# Patient Record
Sex: FEMALE | Race: WHITE | NOT HISPANIC OR LATINO
[De-identification: names, ages, dates, MRNs, and addresses within clinical notes are randomized per-mention and may not be internally consistent; named-entity substitution may affect disease eponyms.]

---

## 2019-02-05 ENCOUNTER — TRANSCRIPTION ENCOUNTER (OUTPATIENT)
Age: 64
End: 2019-02-05

## 2019-02-06 ENCOUNTER — INPATIENT (INPATIENT)
Facility: HOSPITAL | Age: 64
LOS: 6 days | Discharge: HOME | End: 2019-02-13
Attending: INTERNAL MEDICINE | Admitting: INTERNAL MEDICINE
Payer: COMMERCIAL

## 2019-02-06 VITALS
DIASTOLIC BLOOD PRESSURE: 64 MMHG | TEMPERATURE: 100 F | SYSTOLIC BLOOD PRESSURE: 132 MMHG | HEART RATE: 107 BPM | RESPIRATION RATE: 18 BRPM

## 2019-02-06 DIAGNOSIS — Z90.49 ACQUIRED ABSENCE OF OTHER SPECIFIED PARTS OF DIGESTIVE TRACT: Chronic | ICD-10-CM

## 2019-02-06 LAB
ALBUMIN SERPL ELPH-MCNC: 3.6 G/DL — SIGNIFICANT CHANGE UP (ref 3.3–5)
ALP SERPL-CCNC: 73 U/L — SIGNIFICANT CHANGE UP (ref 40–120)
ALT FLD-CCNC: 37 U/L — HIGH (ref 4–33)
ANION GAP SERPL CALC-SCNC: 18 MMO/L — HIGH (ref 7–14)
ANISOCYTOSIS BLD QL: SLIGHT — SIGNIFICANT CHANGE UP
AST SERPL-CCNC: 46 U/L — HIGH (ref 4–32)
B PERT DNA SPEC QL NAA+PROBE: NOT DETECTED — SIGNIFICANT CHANGE UP
BASOPHILS # BLD AUTO: 0.01 K/UL — SIGNIFICANT CHANGE UP (ref 0–0.2)
BASOPHILS NFR BLD AUTO: 0.2 % — SIGNIFICANT CHANGE UP (ref 0–2)
BASOPHILS NFR SPEC: 0 % — SIGNIFICANT CHANGE UP (ref 0–2)
BILIRUB SERPL-MCNC: 0.4 MG/DL — SIGNIFICANT CHANGE UP (ref 0.2–1.2)
BLASTS # FLD: 0 % — SIGNIFICANT CHANGE UP (ref 0–0)
BUN SERPL-MCNC: 19 MG/DL — SIGNIFICANT CHANGE UP (ref 7–23)
C PNEUM DNA SPEC QL NAA+PROBE: NOT DETECTED — SIGNIFICANT CHANGE UP
CALCIUM SERPL-MCNC: 8.7 MG/DL — SIGNIFICANT CHANGE UP (ref 8.4–10.5)
CHLORIDE SERPL-SCNC: 98 MMOL/L — SIGNIFICANT CHANGE UP (ref 98–107)
CO2 SERPL-SCNC: 19 MMOL/L — LOW (ref 22–31)
CREAT SERPL-MCNC: 0.82 MG/DL — SIGNIFICANT CHANGE UP (ref 0.5–1.3)
EOSINOPHIL # BLD AUTO: 0.12 K/UL — SIGNIFICANT CHANGE UP (ref 0–0.5)
EOSINOPHIL NFR BLD AUTO: 2.9 % — SIGNIFICANT CHANGE UP (ref 0–6)
EOSINOPHIL NFR FLD: 4.4 % — SIGNIFICANT CHANGE UP (ref 0–6)
FLUAV H1 2009 PAND RNA SPEC QL NAA+PROBE: NOT DETECTED — SIGNIFICANT CHANGE UP
FLUAV H1 RNA SPEC QL NAA+PROBE: NOT DETECTED — SIGNIFICANT CHANGE UP
FLUAV H3 RNA SPEC QL NAA+PROBE: NOT DETECTED — SIGNIFICANT CHANGE UP
FLUAV SUBTYP SPEC NAA+PROBE: NOT DETECTED — SIGNIFICANT CHANGE UP
FLUBV RNA SPEC QL NAA+PROBE: NOT DETECTED — SIGNIFICANT CHANGE UP
GIANT PLATELETS BLD QL SMEAR: PRESENT — SIGNIFICANT CHANGE UP
GLUCOSE SERPL-MCNC: 98 MG/DL — SIGNIFICANT CHANGE UP (ref 70–99)
HADV DNA SPEC QL NAA+PROBE: NOT DETECTED — SIGNIFICANT CHANGE UP
HBA1C BLD-MCNC: 5.3 % — SIGNIFICANT CHANGE UP (ref 4–5.6)
HCOV PNL SPEC NAA+PROBE: SIGNIFICANT CHANGE UP
HCT VFR BLD CALC: 39.6 % — SIGNIFICANT CHANGE UP (ref 34.5–45)
HGB BLD-MCNC: 12.7 G/DL — SIGNIFICANT CHANGE UP (ref 11.5–15.5)
HMPV RNA SPEC QL NAA+PROBE: NOT DETECTED — SIGNIFICANT CHANGE UP
HPIV1 RNA SPEC QL NAA+PROBE: NOT DETECTED — SIGNIFICANT CHANGE UP
HPIV2 RNA SPEC QL NAA+PROBE: NOT DETECTED — SIGNIFICANT CHANGE UP
HPIV3 RNA SPEC QL NAA+PROBE: NOT DETECTED — SIGNIFICANT CHANGE UP
HPIV4 RNA SPEC QL NAA+PROBE: NOT DETECTED — SIGNIFICANT CHANGE UP
HYPOCHROMIA BLD QL: SLIGHT — SIGNIFICANT CHANGE UP
IMM GRANULOCYTES NFR BLD AUTO: 0.5 % — SIGNIFICANT CHANGE UP (ref 0–1.5)
LYMPHOCYTES # BLD AUTO: 0.26 K/UL — LOW (ref 1–3.3)
LYMPHOCYTES # BLD AUTO: 6.4 % — LOW (ref 13–44)
LYMPHOCYTES NFR SPEC AUTO: 2.6 % — LOW (ref 13–44)
MCHC RBC-ENTMCNC: 25.7 PG — LOW (ref 27–34)
MCHC RBC-ENTMCNC: 32.1 % — SIGNIFICANT CHANGE UP (ref 32–36)
MCV RBC AUTO: 80 FL — SIGNIFICANT CHANGE UP (ref 80–100)
METAMYELOCYTES # FLD: 0 % — SIGNIFICANT CHANGE UP (ref 0–1)
MONOCYTES # BLD AUTO: 0.09 K/UL — SIGNIFICANT CHANGE UP (ref 0–0.9)
MONOCYTES NFR BLD AUTO: 2.2 % — SIGNIFICANT CHANGE UP (ref 2–14)
MONOCYTES NFR BLD: 0.9 % — LOW (ref 2–9)
MYELOCYTES NFR BLD: 0 % — SIGNIFICANT CHANGE UP (ref 0–0)
NEUTROPHIL AB SER-ACNC: 82.4 % — HIGH (ref 43–77)
NEUTROPHILS # BLD AUTO: 3.59 K/UL — SIGNIFICANT CHANGE UP (ref 1.8–7.4)
NEUTROPHILS NFR BLD AUTO: 87.8 % — HIGH (ref 43–77)
NEUTS BAND # BLD: 8.8 % — HIGH (ref 0–6)
NRBC # FLD: 0 K/UL — LOW (ref 25–125)
OTHER - HEMATOLOGY %: 0 — SIGNIFICANT CHANGE UP
PLATELET # BLD AUTO: 98 K/UL — LOW (ref 150–400)
PLATELET COUNT - ESTIMATE: SIGNIFICANT CHANGE UP
PMV BLD: 11.3 FL — SIGNIFICANT CHANGE UP (ref 7–13)
POLYCHROMASIA BLD QL SMEAR: SLIGHT — SIGNIFICANT CHANGE UP
POTASSIUM SERPL-MCNC: 4 MMOL/L — SIGNIFICANT CHANGE UP (ref 3.5–5.3)
POTASSIUM SERPL-SCNC: 4 MMOL/L — SIGNIFICANT CHANGE UP (ref 3.5–5.3)
PROMYELOCYTES # FLD: 0 % — SIGNIFICANT CHANGE UP (ref 0–0)
PROT SERPL-MCNC: 6.8 G/DL — SIGNIFICANT CHANGE UP (ref 6–8.3)
RBC # BLD: 4.95 M/UL — SIGNIFICANT CHANGE UP (ref 3.8–5.2)
RBC # FLD: 13.2 % — SIGNIFICANT CHANGE UP (ref 10.3–14.5)
RSV RNA SPEC QL NAA+PROBE: NOT DETECTED — SIGNIFICANT CHANGE UP
RV+EV RNA SPEC QL NAA+PROBE: NOT DETECTED — SIGNIFICANT CHANGE UP
SMUDGE CELLS # BLD: PRESENT — SIGNIFICANT CHANGE UP
SODIUM SERPL-SCNC: 135 MMOL/L — SIGNIFICANT CHANGE UP (ref 135–145)
VARIANT LYMPHS # BLD: 0.9 % — SIGNIFICANT CHANGE UP
WBC # BLD: 4.09 K/UL — SIGNIFICANT CHANGE UP (ref 3.8–10.5)
WBC # FLD AUTO: 4.09 K/UL — SIGNIFICANT CHANGE UP (ref 3.8–10.5)

## 2019-02-06 PROCEDURE — 76705 ECHO EXAM OF ABDOMEN: CPT | Mod: 26

## 2019-02-06 PROCEDURE — 71046 X-RAY EXAM CHEST 2 VIEWS: CPT | Mod: 26

## 2019-02-06 RX ORDER — DIPHENHYDRAMINE HCL 50 MG
25 CAPSULE ORAL EVERY 6 HOURS
Qty: 0 | Refills: 0 | Status: DISCONTINUED | OUTPATIENT
Start: 2019-02-06 | End: 2019-02-10

## 2019-02-06 RX ORDER — SODIUM CHLORIDE 9 MG/ML
1000 INJECTION INTRAMUSCULAR; INTRAVENOUS; SUBCUTANEOUS
Qty: 0 | Refills: 0 | Status: DISCONTINUED | OUTPATIENT
Start: 2019-02-06 | End: 2019-02-10

## 2019-02-06 RX ORDER — DIPHENHYDRAMINE HCL 50 MG
25 CAPSULE ORAL ONCE
Qty: 0 | Refills: 0 | Status: COMPLETED | OUTPATIENT
Start: 2019-02-06 | End: 2019-02-06

## 2019-02-06 RX ORDER — ACETAMINOPHEN 500 MG
975 TABLET ORAL ONCE
Qty: 0 | Refills: 0 | Status: DISCONTINUED | OUTPATIENT
Start: 2019-02-06 | End: 2019-02-06

## 2019-02-06 RX ORDER — IBUPROFEN 200 MG
600 TABLET ORAL EVERY 6 HOURS
Qty: 0 | Refills: 0 | Status: DISCONTINUED | OUTPATIENT
Start: 2019-02-06 | End: 2019-02-07

## 2019-02-06 RX ORDER — ACETAMINOPHEN 500 MG
1000 TABLET ORAL ONCE
Qty: 0 | Refills: 0 | Status: COMPLETED | OUTPATIENT
Start: 2019-02-06 | End: 2019-02-06

## 2019-02-06 RX ORDER — DIPHENHYDRAMINE HCL 50 MG
25 CAPSULE ORAL ONCE
Qty: 0 | Refills: 0 | Status: DISCONTINUED | OUTPATIENT
Start: 2019-02-06 | End: 2019-02-06

## 2019-02-06 RX ORDER — SODIUM CHLORIDE 9 MG/ML
1000 INJECTION INTRAMUSCULAR; INTRAVENOUS; SUBCUTANEOUS ONCE
Qty: 0 | Refills: 0 | Status: COMPLETED | OUTPATIENT
Start: 2019-02-06 | End: 2019-02-06

## 2019-02-06 RX ADMIN — SODIUM CHLORIDE 1000 MILLILITER(S): 9 INJECTION INTRAMUSCULAR; INTRAVENOUS; SUBCUTANEOUS at 20:02

## 2019-02-06 RX ADMIN — Medication 25 MILLIGRAM(S): at 20:13

## 2019-02-06 RX ADMIN — SODIUM CHLORIDE 1000 MILLILITER(S): 9 INJECTION INTRAMUSCULAR; INTRAVENOUS; SUBCUTANEOUS at 18:19

## 2019-02-06 RX ADMIN — Medication 400 MILLIGRAM(S): at 20:12

## 2019-02-06 RX ADMIN — Medication 125 MILLIGRAM(S): at 20:13

## 2019-02-06 RX ADMIN — SODIUM CHLORIDE 1000 MILLILITER(S): 9 INJECTION INTRAMUSCULAR; INTRAVENOUS; SUBCUTANEOUS at 21:11

## 2019-02-06 RX ADMIN — Medication 1000 MILLIGRAM(S): at 21:12

## 2019-02-06 NOTE — ED CDU PROVIDER INITIAL DAY NOTE - INDICATION FOR OBSERVATION
Other/Diagnostic Uncertainty/Supportive care, am labs per orders, Dermatology consultation 2/7 daytime, general observation care / monitoring.

## 2019-02-06 NOTE — ED ADULT NURSE NOTE - OBJECTIVE STATEMENT
break coverage RN- pt with + flu went to urgent care on Saturday and given naprosyn then developed generalized rash all over rash, denies any respiratory distress, facial swelling- pt awake, a/ox3, vitally stable in NAD at this time, IV 22g placed to right hand, blood work sent, medicated as ordered. MD at bedside, awaiting further orders from MD, will continue to monitor, pt safety maintained.

## 2019-02-06 NOTE — ED CDU PROVIDER INITIAL DAY NOTE - MEDICAL DECISION MAKING DETAILS
Supportive care, am labs per orders, Dermatology consultation 2/7 daytime, general observation care / monitoring.

## 2019-02-06 NOTE — ED ADULT TRIAGE NOTE - CHIEF COMPLAINT QUOTE
Flu like symptoms since Saturday went to urgent care + flu urgent care gave the pt naprosyn    pt developed rash all over her body no respiratory distress noted

## 2019-02-06 NOTE — CHART NOTE - NSCHARTNOTEFT_GEN_A_CORE
Dermatology Resident Chart Note    Pt and clinical photographs discussed with ED Team and attending Dr. Palumbo remotely. Briefly, 64 y/o F no significant PMHx presenting with diffuse macular rash x 1 day with low-grade fever, RUQ tenderness, systemic sx, and transaminitis to AST 45, ALT 37. Cr 0.82 and WBC 4.09 w/o eosinophilia. Dx at urgent care with influenza yesterday, and given ibuprofen. No new medications, herbal supplements, oral/mucosal involvement, facial swelling, or LAD.     Overall clinical picture concerning for evolving DRESS vs. drug eruption vs. viral exanthem.     At this time, recommend:  - agree with ED plan to admit to CDU for close monitoring  - send RVP to confirm dx of influenza and r/o other viral illnesses  - repeat CMP tomorrow AM to trend LFTs  - will f/u RUQ U/S   - page dermatology on call resident for any changes in status or development of skin sloughing, pain with urination, facial swelling, lymphadenopathy, mucosal involvement, or blistering as these are a sign of more serious drug eruption      Recommendations were verbally communicated to primary team. Dermatology consult team will officially round and staff the patient tomorrow. Please call [phone] with any questions. Dermatology Resident Chart Note    Pt and clinical photographs discussed with ED Team and attending Dr. Palumbo remotely. Briefly, 64 y/o F no significant PMHx presenting with diffuse macular rash x 1 day with low-grade fever, RUQ tenderness, systemic sx, and transaminitis to AST 45, ALT 37. Cr 0.82 and WBC 4.09 w/o eosinophilia. Dx at urgent care with influenza yesterday, and given ibuprofen. No new medications, herbal supplements, oral/mucosal involvement, facial swelling, or LAD.     Overall clinical picture concerning for evolving DRESS vs. drug eruption vs. viral exanthem.     At this time, recommend:  - agree with ED plan to admit to CDU for close monitoring  - send RVP to confirm dx of influenza and r/o other viral illnesses  - repeat CMP tomorrow AM to trend LFTs  - will f/u RUQ U/S   - page dermatology on call resident for any changes in status or development of skin sloughing, pain with urination, facial swelling, lymphadenopathy, mucosal involvement, or blistering as these are a sign of more serious drug eruption      Recommendations were verbally communicated to primary team. Dermatology consult team will officially round and staff the patient tomorrow. Please call [phone] with any questions.    Nara Mcneil, PGY2  [facility] Dermatology

## 2019-02-06 NOTE — ED PROVIDER NOTE - OBJECTIVE STATEMENT
Pt is a 62 y/o F nonsmoker PMHx appendectomy p/w body aches and chills x 3 days. Pt notes gradual onset generalized body aches, chills, sore throat, sneezing.  Pt also notes associated nausea and intermittent generalized abdominal "tickling" sensation.  Pt notes intermittent nonproductive cough.  Pt went to urgent care yesterday where pt had swab performed which revealed influenza.  Pt was given IBU at that time.  Pt notes later yesterday evening, pt noticed a red rash gradually worsening involving bilateral arms, abdomen, back, lower extremities, palms and soles . pt notes rash is intermittently itchy.  Pt states she has taken IBU in past before without any issues.  Pt denies any vomiting, chest pain, SOB, difficulty swallowing, new medications/foods/cosmetic products, h/o hiking, oral sores, or any other specific complaints.

## 2019-02-06 NOTE — ED PROVIDER NOTE - ATTENDING CONTRIBUTION TO CARE
DR. RODRIGUES, ATTENDING MD-  I performed a face to face bedside interview with patient regarding history of present illness, review of symptoms and past medical history. I completed an independent physical exam.  I have discussed patient's plan of care with PA.   Documentation as above in the note.    Krishan: c/o 2 days of malaise, cough went to urgent care yesterday where was diagnosed with influenza and given naprosyn.  ~1-2 hrs later patient developed diffuse rash which has worsened prompting her to come to ER.  No throat tightness/tongue swelling, no wheeze.  rash is itchy.  No recent abx or travel.  On exam, OP benign, no tongue swelling or uvular swelling, no lesions.  Lungs clear, no wheeze.  Heart rrr.  abd soft, but +TTP RUQ, neg Cavazos.  Skin: diffuse erythematous (appears urticarial to me) rash to chest/back/neck/thighs/arms/palms/soles.  No desquamation.  No blistering lesions.      A/P: rash - ?urticaria vs viral exanthem.  Will call derm given the findings with palm/sole involvement.  But patient otherwise non-toxic appearing.  RUQ pain - will check LFTs, sono

## 2019-02-06 NOTE — ED ADULT NURSE NOTE - NSIMPLEMENTINTERV_GEN_ALL_ED
Implemented All Universal Safety Interventions:  [place] to call system. Call bell, personal items and telephone within reach. Instruct patient to call for assistance. Room bathroom lighting operational. Non-slip footwear when patient is off stretcher. Physically safe environment: no spills, clutter or unnecessary equipment. Stretcher in lowest position, wheels locked, appropriate side rails in place.

## 2019-02-06 NOTE — ED CDU PROVIDER INITIAL DAY NOTE - OBJECTIVE STATEMENT
Pt is a 62 y/o F nonsmoker PMHx appendectomy p/w body aches and chills x 3 days. Pt notes gradual onset generalized body aches, chills, sore throat, sneezing.  Pt also notes associated nausea and intermittent generalized abdominal "tickling" sensation.  Pt notes intermittent nonproductive cough.  Pt went to urgent care yesterday where pt had swab performed which revealed influenza.  Pt was given IBU at that time.  Pt notes later yesterday evening, pt noticed a red rash gradually worsening involving bilateral arms, abdomen, back, lower extremities, palms and soles . pt notes rash is intermittently itchy.  Pt states she has taken IBU in past before without any issues.  Pt denies any vomiting, chest pain, SOB, difficulty swallowing, new medications/foods/cosmetic products, h/o hiking, oral sores, or any other specific complaints.    CDU [place] [place] Note----  64 yo female, no stated PMH, presented to the ED for body aches, chills, dx with influenza at urgent care center yesterday; also with rash since yesterday evening as per above.  Pt. was evaluated in the ED; Dermatology was contacted.  Pt. dispo'd to CDU for continued care plan:  Supportive care, am labs per orders, Dermatology consultation 2/7 daytime, general observation care / monitoring. Pt is a 64 y/o F nonsmoker PMHx appendectomy p/w body aches and chills x 3 days. Pt notes gradual onset generalized body aches, chills, sore throat, sneezing.  Pt also notes associated nausea and intermittent generalized abdominal "tickling" sensation.  Pt notes intermittent nonproductive cough.  Pt went to urgent care yesterday where pt had swab performed which revealed influenza.  Pt was given IBU at that time.  Pt notes later yesterday evening, pt noticed a red rash gradually worsening involving bilateral arms, abdomen, back, lower extremities, palms and soles . pt notes rash is intermittently itchy.  Pt states she has taken IBU in past before without any issues.  Pt denies any vomiting, chest pain, SOB, difficulty swallowing, new medications/foods/cosmetic products, h/o hiking, oral sores, or any other specific complaints.    CDU [place] [place] Note----  64 yo female, no stated PMH, presented to the ED for body aches, chills, dx with influenza at urgent care center yesterday; also with rash since yesterday evening as per above.  Pt. was evaluated in the ED; Dermatology was contacted.  Pt. dispo'd to CDU for continued care plan:  Supportive care, am labs per orders, Dermatology consultation 2/7 daytime, general observation care / monitoring.  No other c/o.

## 2019-02-06 NOTE — ED PROVIDER NOTE - MEDICAL DECISION MAKING DETAILS
Pt is a 64 y/o F nonsmoker PMHx appendectomy p/w body aches and chills x 3 days.  -- influenza, possible viral exanthem -- labs, RUQ sono, CXR, iv fluids, antipyretics, derm consult

## 2019-02-06 NOTE — ED CDU PROVIDER INITIAL DAY NOTE - ATTENDING CONTRIBUTION TO CARE
DR. RODRIGUES, ATTENDING MD-  I performed a face to face bedside interview with patient regarding history of present illness, review of symptoms and past medical history. I completed an independent physical exam.  I have discussed patient's plan of care with PA.   Documentation as above in the note.

## 2019-02-07 ENCOUNTER — RESULT REVIEW (OUTPATIENT)
Age: 64
End: 2019-02-07

## 2019-02-07 DIAGNOSIS — R21 RASH AND OTHER NONSPECIFIC SKIN ERUPTION: ICD-10-CM

## 2019-02-07 DIAGNOSIS — Z29.9 ENCOUNTER FOR PROPHYLACTIC MEASURES, UNSPECIFIED: ICD-10-CM

## 2019-02-07 LAB
ALBUMIN SERPL ELPH-MCNC: 3.2 G/DL — LOW (ref 3.3–5)
ALP SERPL-CCNC: 67 U/L — SIGNIFICANT CHANGE UP (ref 40–120)
ALT FLD-CCNC: 27 U/L — SIGNIFICANT CHANGE UP (ref 4–33)
ANION GAP SERPL CALC-SCNC: 11 MMO/L — SIGNIFICANT CHANGE UP (ref 7–14)
APPEARANCE UR: CLEAR — SIGNIFICANT CHANGE UP
AST SERPL-CCNC: 31 U/L — SIGNIFICANT CHANGE UP (ref 4–32)
BACTERIA # UR AUTO: NEGATIVE — SIGNIFICANT CHANGE UP
BASOPHILS # BLD AUTO: 0 K/UL — SIGNIFICANT CHANGE UP (ref 0–0.2)
BASOPHILS NFR BLD AUTO: 0 % — SIGNIFICANT CHANGE UP (ref 0–2)
BASOPHILS NFR SPEC: 0 % — SIGNIFICANT CHANGE UP (ref 0–2)
BILIRUB SERPL-MCNC: 0.2 MG/DL — SIGNIFICANT CHANGE UP (ref 0.2–1.2)
BILIRUB UR-MCNC: NEGATIVE — SIGNIFICANT CHANGE UP
BLASTS # FLD: 0 % — SIGNIFICANT CHANGE UP (ref 0–0)
BLOOD UR QL VISUAL: NEGATIVE — SIGNIFICANT CHANGE UP
BUN SERPL-MCNC: 12 MG/DL — SIGNIFICANT CHANGE UP (ref 7–23)
CALCIUM SERPL-MCNC: 8 MG/DL — LOW (ref 8.4–10.5)
CHLORIDE SERPL-SCNC: 109 MMOL/L — HIGH (ref 98–107)
CO2 SERPL-SCNC: 22 MMOL/L — SIGNIFICANT CHANGE UP (ref 22–31)
COLOR SPEC: SIGNIFICANT CHANGE UP
CREAT SERPL-MCNC: 0.57 MG/DL — SIGNIFICANT CHANGE UP (ref 0.5–1.3)
CRP SERPL-MCNC: 135.8 MG/L — HIGH
EOSINOPHIL # BLD AUTO: 0 K/UL — SIGNIFICANT CHANGE UP (ref 0–0.5)
EOSINOPHIL NFR BLD AUTO: 0 % — SIGNIFICANT CHANGE UP (ref 0–6)
EOSINOPHIL NFR FLD: 1.8 % — SIGNIFICANT CHANGE UP (ref 0–6)
ERYTHROCYTE [SEDIMENTATION RATE] IN BLOOD: 27 MM/HR — HIGH (ref 4–25)
GIANT PLATELETS BLD QL SMEAR: PRESENT — SIGNIFICANT CHANGE UP
GLUCOSE SERPL-MCNC: 162 MG/DL — HIGH (ref 70–99)
GLUCOSE UR-MCNC: NEGATIVE — SIGNIFICANT CHANGE UP
HAV IGM SER-ACNC: NONREACTIVE — SIGNIFICANT CHANGE UP
HBV CORE IGM SER-ACNC: NONREACTIVE — SIGNIFICANT CHANGE UP
HBV SURFACE AG SER-ACNC: NONREACTIVE — SIGNIFICANT CHANGE UP
HCT VFR BLD CALC: 35.6 % — SIGNIFICANT CHANGE UP (ref 34.5–45)
HCV AB S/CO SERPL IA: 0.16 S/CO — SIGNIFICANT CHANGE UP
HCV AB SERPL-IMP: SIGNIFICANT CHANGE UP
HGB BLD-MCNC: 11.4 G/DL — LOW (ref 11.5–15.5)
HYALINE CASTS # UR AUTO: NEGATIVE — SIGNIFICANT CHANGE UP
IMM GRANULOCYTES NFR BLD AUTO: 0.4 % — SIGNIFICANT CHANGE UP (ref 0–1.5)
KETONES UR-MCNC: NEGATIVE — SIGNIFICANT CHANGE UP
LEUKOCYTE ESTERASE UR-ACNC: SIGNIFICANT CHANGE UP
LYMPHOCYTES # BLD AUTO: 0.35 K/UL — LOW (ref 1–3.3)
LYMPHOCYTES # BLD AUTO: 15.2 % — SIGNIFICANT CHANGE UP (ref 13–44)
LYMPHOCYTES NFR SPEC AUTO: 5.4 % — LOW (ref 13–44)
MCHC RBC-ENTMCNC: 25.8 PG — LOW (ref 27–34)
MCHC RBC-ENTMCNC: 32 % — SIGNIFICANT CHANGE UP (ref 32–36)
MCV RBC AUTO: 80.5 FL — SIGNIFICANT CHANGE UP (ref 80–100)
METAMYELOCYTES # FLD: 0.9 % — SIGNIFICANT CHANGE UP (ref 0–1)
MICROCYTES BLD QL: SLIGHT — SIGNIFICANT CHANGE UP
MONOCYTES # BLD AUTO: 0.05 K/UL — SIGNIFICANT CHANGE UP (ref 0–0.9)
MONOCYTES NFR BLD AUTO: 2.2 % — SIGNIFICANT CHANGE UP (ref 2–14)
MONOCYTES NFR BLD: 0 % — LOW (ref 2–9)
MYELOCYTES NFR BLD: 0 % — SIGNIFICANT CHANGE UP (ref 0–0)
NEUTROPHIL AB SER-ACNC: 43.7 % — SIGNIFICANT CHANGE UP (ref 43–77)
NEUTROPHILS # BLD AUTO: 1.9 K/UL — SIGNIFICANT CHANGE UP (ref 1.8–7.4)
NEUTROPHILS NFR BLD AUTO: 82.2 % — HIGH (ref 43–77)
NEUTS BAND # BLD: 44.6 % — HIGH (ref 0–6)
NITRITE UR-MCNC: NEGATIVE — SIGNIFICANT CHANGE UP
NRBC # FLD: 0 K/UL — LOW (ref 25–125)
OTHER - HEMATOLOGY %: 0 — SIGNIFICANT CHANGE UP
OVALOCYTES BLD QL SMEAR: SLIGHT — SIGNIFICANT CHANGE UP
PH UR: 6.5 — SIGNIFICANT CHANGE UP (ref 5–8)
PLATELET # BLD AUTO: 82 K/UL — LOW (ref 150–400)
PLATELET COUNT - ESTIMATE: SIGNIFICANT CHANGE UP
PMV BLD: 11.9 FL — SIGNIFICANT CHANGE UP (ref 7–13)
POTASSIUM SERPL-MCNC: 4 MMOL/L — SIGNIFICANT CHANGE UP (ref 3.5–5.3)
POTASSIUM SERPL-SCNC: 4 MMOL/L — SIGNIFICANT CHANGE UP (ref 3.5–5.3)
PROMYELOCYTES # FLD: 0 % — SIGNIFICANT CHANGE UP (ref 0–0)
PROT SERPL-MCNC: 5.9 G/DL — LOW (ref 6–8.3)
PROT UR-MCNC: 20 — SIGNIFICANT CHANGE UP
RBC # BLD: 4.42 M/UL — SIGNIFICANT CHANGE UP (ref 3.8–5.2)
RBC # FLD: 13.4 % — SIGNIFICANT CHANGE UP (ref 10.3–14.5)
RBC CASTS # UR COMP ASSIST: SIGNIFICANT CHANGE UP (ref 0–?)
SODIUM SERPL-SCNC: 142 MMOL/L — SIGNIFICANT CHANGE UP (ref 135–145)
SP GR SPEC: 1.02 — SIGNIFICANT CHANGE UP (ref 1–1.04)
SQUAMOUS # UR AUTO: SIGNIFICANT CHANGE UP
T PALLIDUM AB TITR SER: NEGATIVE — SIGNIFICANT CHANGE UP
UROBILINOGEN FLD QL: NORMAL — SIGNIFICANT CHANGE UP
VARIANT LYMPHS # BLD: 3.6 % — SIGNIFICANT CHANGE UP
WBC # BLD: 2.31 K/UL — LOW (ref 3.8–10.5)
WBC # FLD AUTO: 2.31 K/UL — LOW (ref 3.8–10.5)
WBC UR QL: SIGNIFICANT CHANGE UP (ref 0–?)

## 2019-02-07 PROCEDURE — 99223 1ST HOSP IP/OBS HIGH 75: CPT | Mod: AI

## 2019-02-07 PROCEDURE — 99255 IP/OBS CONSLTJ NEW/EST HI 80: CPT | Mod: GC

## 2019-02-07 PROCEDURE — 88305 TISSUE EXAM BY PATHOLOGIST: CPT | Mod: 26

## 2019-02-07 RX ORDER — ACETAMINOPHEN 500 MG
650 TABLET ORAL EVERY 6 HOURS
Qty: 0 | Refills: 0 | Status: DISCONTINUED | OUTPATIENT
Start: 2019-02-07 | End: 2019-02-11

## 2019-02-07 RX ORDER — ACETAMINOPHEN 500 MG
650 TABLET ORAL ONCE
Qty: 0 | Refills: 0 | Status: COMPLETED | OUTPATIENT
Start: 2019-02-07 | End: 2019-02-07

## 2019-02-07 RX ADMIN — SODIUM CHLORIDE 150 MILLILITER(S): 9 INJECTION INTRAMUSCULAR; INTRAVENOUS; SUBCUTANEOUS at 02:02

## 2019-02-07 RX ADMIN — Medication 75 MILLIGRAM(S): at 21:40

## 2019-02-07 RX ADMIN — Medication 650 MILLIGRAM(S): at 10:19

## 2019-02-07 RX ADMIN — Medication 650 MILLIGRAM(S): at 09:49

## 2019-02-07 NOTE — H&P ADULT - ASSESSMENT
64 yo F with no PMH p/w diffuse rash and high fevers of unclear etiology but likely 2/2 viral exanthem vs. drug eruption.

## 2019-02-07 NOTE — ED CDU PROVIDER SUBSEQUENT DAY NOTE - PROGRESS NOTE DETAILS
Pt. objectively noted to be resting comfortably in the interim; no issues or c/o thus far.  Pt. stable at present; will continue to monitor.  Pt. will be signed out to the day CDU PA (Donato) and attending (Dr. Weeks) at 0700 hrs. Dermatology resident saw pt, will discuss with attending. Lab called, band neutrophils 44% this morning. Called ID who will consult on pt, paged hospitalist for admission. Spoke with ID who will see pt in the ED, spoke with Dr.John Almanza hospitalist who accepts this pt to his service

## 2019-02-07 NOTE — H&P ADULT - PROBLEM SELECTOR PLAN 2
DVT ppx: Improve score 0, low risk for DVT, no pharmacological ppx required. SCDs  Diet: Regular diet

## 2019-02-07 NOTE — ED CDU PROVIDER SUBSEQUENT DAY NOTE - MEDICAL DECISION MAKING DETAILS
63yF w/no pmhx p/w diffuse body rash in the setting of flu like symptoms and positive flu test at  yesterday. In CDU for dermatology evaluation

## 2019-02-07 NOTE — CONSULT NOTE ADULT - SUBJECTIVE AND OBJECTIVE BOX
HPI:  64 yo F with no significant PMH admitted for bandemia in the setting of fever and rash.  Patient reports rash started 2 days ago on her arms and legs and quickly progressed to involve her trunk.  Rash is completely asymptomatic.  Only medication she has taken in the past 6 months is ibuprofen which she takes occasionally.  Denies any other new medications, OTC medications, or herbal supplements. Has taken it for the past 3 days because she has been feeling ill.  Has had fever for 2 days, URI Sx, sore throat, and intermittent abdominal pain.  Normal appetite, also with myalgias.  Patient reports she went to Urgent Care yesterday and had rapid flu which was positive.  Denies sick contacts or recent travel.      PAST MEDICAL & SURGICAL HISTORY:  No pertinent past medical history  History of appendectomy      REVIEW OF SYSTEMS      General: +fever, +weakness    Skin/Breast: see HPI  	  Ophthalmologic: no eye pain or change in vision  	  ENMT: +rhinorrhea, sore throat    Respiratory and Thorax: +cough  	  Cardiovascular: no palpitations or chest pain    Gastrointestinal: no abdominal pain or blood in stool     Genitourinary: no dysuria or frequency    Musculoskeletal: no joint pains or weakness	    Neurological:no weakness, numbness, or tingling    MEDICATIONS  (STANDING):  sodium chloride 0.9%. 1000 milliLiter(s) (150 mL/Hr) IV Continuous <Continuous>    MEDICATIONS  (PRN):  diphenhydrAMINE   Injectable 25 milliGRAM(s) IV Push every 6 hours PRN Itching  ibuprofen  Tablet. 600 milliGRAM(s) Oral every 6 hours PRN Temp greater or equal to 38C (100.4F), Mild Pain (1 - 3), Moderate Pain (4 - 6)      Allergies    penicillin (Rash)    Intolerances        SOCIAL HISTORY:    FAMILY HISTORY:  No pertinent family history in first degree relatives      Vital Signs Last 24 Hrs  T(C): 36.3 (07 Feb 2019 10:45), Max: 38.2 (06 Feb 2019 20:15)  T(F): 97.4 (07 Feb 2019 10:45), Max: 100.8 (06 Feb 2019 20:15)  HR: 75 (07 Feb 2019 10:45) (71 - 107)  BP: 108/53 (07 Feb 2019 10:45) (97/47 - 132/64)  BP(mean): --  RR: 17 (07 Feb 2019 10:45) (16 - 18)  SpO2: 100% (07 Feb 2019 10:45) (99% - 100%)    PHYSICAL EXAM:     The patient was alert and oriented X 3, well nourished, and in no  apparent distress.  OP showed no ulcerations  There was no visible lymphadenopathy.  Conjunctiva were non injected  There was no clubbing or edema of extremities.  The scalp, hair, face, eyebrows, lips, OP, neck, chest, back,   extremities X 4, nails were examined.  There was no hyperhidrosis or bromhidrosis.    Of note on skin exam:  confluent erythematous patches on the trunk, upper extremities, and proximal lower extremities, involvement of the palms bilaterally; some areas with dusky centers; labial mucosa with shallow erosions, otherwise mucosal surfaces examined and normal in appearance; neg. Nikolsky sign      LABS:                        11.4   2.31  )-----------( 82       ( 07 Feb 2019 07:12 )             35.6     02-07    142  |  109<H>  |  12  ----------------------------<  162<H>  4.0   |  22  |  0.57    Ca    8.0<L>      07 Feb 2019 07:12    TPro  5.9<L>  /  Alb  3.2<L>  /  TBili  0.2  /  DBili  x   /  AST  31  /  ALT  27  /  AlkPhos  67  02-07      RADIOLOGY & ADDITIONAL STUDIES: N/A    Nora Pickering MD (PGY-3)   Dermatology Resident  [facility]  Pager: [phone]  Office [phone] HPI:  64 yo F with no significant PMH admitted for bandemia in the setting of fever and rash.  Patient reports rash started 2 days ago on her arms and legs and quickly progressed to involve her trunk.  Rash is completely asymptomatic.  Only medication she has taken in the past 6 months is ibuprofen which she takes occasionally.  Denies any other new medications, OTC medications, or herbal supplements. Has taken it for the past 3 days because she has been feeling ill.  Has had fever for 2 days, URI Sx, sore throat, and intermittent abdominal pain.  Normal appetite, also with myalgias.  Patient reports she went to Urgent Care yesterday and had rapid flu which was positive.  Denies sick contacts or recent travel.  Denies skin pain, dysuria, eye pain, vision changes, skin sloughing, or blisters.        PAST MEDICAL & SURGICAL HISTORY:  No pertinent past medical history  History of appendectomy      REVIEW OF SYSTEMS      General: +fever, +weakness    Skin/Breast: see HPI  	  Ophthalmologic: no eye pain or change in vision  	  ENMT: +rhinorrhea, sore throat    Respiratory and Thorax: +cough  	  Cardiovascular: no palpitations or chest pain    Gastrointestinal: no abdominal pain or blood in stool     Genitourinary: no dysuria or frequency    Musculoskeletal: no joint pains or weakness	    Neurological:no weakness, numbness, or tingling    MEDICATIONS  (STANDING):  sodium chloride 0.9%. 1000 milliLiter(s) (150 mL/Hr) IV Continuous <Continuous>    MEDICATIONS  (PRN):  diphenhydrAMINE   Injectable 25 milliGRAM(s) IV Push every 6 hours PRN Itching  ibuprofen  Tablet. 600 milliGRAM(s) Oral every 6 hours PRN Temp greater or equal to 38C (100.4F), Mild Pain (1 - 3), Moderate Pain (4 - 6)      Allergies    penicillin (Rash)    Intolerances        SOCIAL HISTORY:    FAMILY HISTORY:  No pertinent family history in first degree relatives      Vital Signs Last 24 Hrs  T(C): 36.3 (07 Feb 2019 10:45), Max: 38.2 (06 Feb 2019 20:15)  T(F): 97.4 (07 Feb 2019 10:45), Max: 100.8 (06 Feb 2019 20:15)  HR: 75 (07 Feb 2019 10:45) (71 - 107)  BP: 108/53 (07 Feb 2019 10:45) (97/47 - 132/64)  BP(mean): --  RR: 17 (07 Feb 2019 10:45) (16 - 18)  SpO2: 100% (07 Feb 2019 10:45) (99% - 100%)    PHYSICAL EXAM:     The patient was alert and oriented X 3, well nourished, and in no  apparent distress.  OP showed no ulcerations  There was no visible lymphadenopathy.  Conjunctiva were non injected  There was no clubbing or edema of extremities.  The scalp, hair, face, eyebrows, lips, OP, neck, chest, back,   extremities X 4, nails were examined.  There was no hyperhidrosis or bromhidrosis.    Of note on skin exam:  confluent erythematous patches on the trunk, upper extremities, and proximal lower extremities, involvement of the palms bilaterally; some areas with dusky centers; upper and lower labial mucosa with shallow erosions, +palatal petichiae; otherwise mucosal surfaces examined and normal in appearance; neg. Nikolsky sign      LABS:                        11.4   2.31  )-----------( 82       ( 07 Feb 2019 07:12 )             35.6     02-07    142  |  109<H>  |  12  ----------------------------<  162<H>  4.0   |  22  |  0.57    Ca    8.0<L>      07 Feb 2019 07:12    TPro  5.9<L>  /  Alb  3.2<L>  /  TBili  0.2  /  DBili  x   /  AST  31  /  ALT  27  /  AlkPhos  67  02-07    < from: US Abdomen Limited (02.06.19 @ 18:53) >  EXAM:  US ABDOMEN LIMITED        PROCEDURE DATE:  Feb 6 2019         INTERPRETATION:  CLINICAL INFORMATION: Right upper quadrant pain for 2   days, nausea.    COMPARISON: None available.    TECHNIQUE: Sonography of the right upper quadrant.     FINDINGS:    Liver: Within normal limits.    Bile ducts: Normal caliber. Common hepatic duct measures 3 mm.     Gallbladder: Contracted. No cholelithiasis, gallbladder wall thickening,   or pericholecystic fluid.    Pancreas: Not well visualized.    Right kidney: 2.6 cm. No hydronephrosis.    Ascites: None.    IVC: Visualized portions are within normal limits.    IMPRESSION:     No sonographic evidence of acute cholecystitis.        PAULO PRIETO M.D., RADIOLOGY RESIDENT  This document has been electronically signed.  BENNY STOKES M.D., ATTENDING RADIOLOGIST  This document has been electronically signed. Feb 6 2019  7:42PM                 RADIOLOGY & ADDITIONAL STUDIES: N/A    Nora Pickering MD (PGY-3)   Dermatology Resident  [facility]  Pager: [phone]  Office [phone]

## 2019-02-07 NOTE — ED CDU PROVIDER SUBSEQUENT DAY NOTE - PHYSICAL EXAMINATION
Skin: diffuse erythematous macular rash to arms/legs/trunk/back/palms and soles, this morning pt with new raised papule to left lateral tongue, no involvement of the palate or gums  blanching, non-tender to palpation

## 2019-02-07 NOTE — CONSULT NOTE ADULT - ATTENDING COMMENTS
63F with influenza, rash, egg allergy  -rapid flu positive at [facility] urgent care on 2/6 - urgent care note reviewed on HIE  -pt has measles IgG+ - she is immune to measles  -low suspicion of measles  -this is flu with rash  -rash could be from flu vs motrin related - f/u biopsy  -Tamiflu 75 mg po bid  -dc airborne precautions   -droplet precautions  -she has an egg allergy and did not get a flu shot this year    Norbert Leyva  Attending Physician   Division of Infectious Disease  Pager #[phone]  After 5pm/weekend or no response, call #[phone]
***Plan discussed with attending on call Dr. Anjelica Palumbo, patient will be rounded on with attending on 2/8***

## 2019-02-07 NOTE — ED CDU PROVIDER DISPOSITION NOTE - CLINICAL COURSE
64 yo F w/no pmhx p/w diffuse maculopapular blanching body rash which does not spare the palms of the hands and soles of the feet in the setting of flu like symptoms and positive flu test at  on 2/6. Pt was sent to CDU for dermatology evaluation who recommended admission for further ID work-up. Pt was found to be leukopenic w/ elevated bands. RVP neg, CXR neg, CT ABD neg, syphilis neg. Will admit and since the pt is being tested for measles although low likelihood as she has recent IgG+ titers, will place in isolation room.

## 2019-02-07 NOTE — H&P ADULT - PROBLEM SELECTOR PLAN 1
Pt with 2 day h/o diffuse rash associated with fever and arthralgias. Likely viral exanthem, less likely drug-induced as no new medications started, also very low on ddx is measles as pt has had positive IgG titers in past few years (IgG level 215 AU/ml in 2015).  - Derm c/s, recs appreciated.  - F/u derm punch biopsy results  - Per derm, low concern for measles but recommend sending measles IgM, IgG and placing on airborne isolation  - ID c/s, recs appreciated  - Supportive care for now  - Benadryl PRN for itching  - Tylenol PRN for pain and fever

## 2019-02-07 NOTE — H&P ADULT - NSHPLABSRESULTS_GEN_ALL_CORE
CBC Full  -  ( 07 Feb 2019 07:12 )  WBC Count : 2.31 K/uL  Hemoglobin : 11.4 g/dL  Hematocrit : 35.6 %  Platelet Count - Automated : 82 K/uL  Mean Cell Volume : 80.5 fL  Mean Cell Hemoglobin : 25.8 pg  Mean Cell Hemoglobin Concentration : 32.0 %  Auto Neutrophil # : 1.90 K/uL  Auto Lymphocyte # : 0.35 K/uL  Auto Monocyte # : 0.05 K/uL  Auto Eosinophil # : 0.00 K/uL  Auto Basophil # : 0.00 K/uL  Auto Neutrophil % : 82.2 %  Auto Lymphocyte % : 15.2 %  Auto Monocyte % : 2.2 %  Auto Eosinophil % : 0.0 %  Auto Basophil % : 0.0 %  Band Neutrophils %: 44.6 % (02.07.19 @ 07:12)    02-07    142  |  109<H>  |  12  ----------------------------<  162<H>  4.0   |  22  |  0.57    Ca    8.0<L>      07 Feb 2019 07:12    TPro  5.9<L>  /  Alb  3.2<L>  /  TBili  0.2  /  DBili  x   /  AST  31  /  ALT  27  /  AlkPhos  67  02-07    C-Reactive Protein, Serum: 135.8 mg/L (02.07.19 @ 07:12)  Treponema Pallidum Antibody Interpretation: Negative    Acute Hepatitis Panel (02.06.19 @ 20:22)    Hepatitis C Virus Interpretation: Nonreactive Hepatitis C AB  S/CO Ratio                        Interpretation  < 1.0                                     Non-Reactive  1.0 - 4.9                           Weakly-Reactive  > 5.0                                 Reactive  Non-Reactive: Aperson with a non-reactive HCV antibody  result is considered uninfected.  No further action is  needed unless recent infection is suspected.  In these  cases, consider repeat testing later to detect  seroconversion..  Weakly-Reactive: HCV antibody test is abnormal, HCV RNA  Qualitative test will follow.  Reactive: HCV antibody test is abnormal, HCV RNA  Qualitative test will follow.  Note: HCV antibody testing is performed on the [employer]   system.    Hepatitis C Virus S/CO Ratio: 0.16 S/CO    Hepatitis B Core IgM Antibody: Nonreactive    Hepatitis B Surface Antigen: Nonreactive    Hepatitis A IgM Antibody: Nonreactive        Rapid Respiratory Viral Panel (02.06.19 @ 20:09)    Adenovirus (RapRVP): Not Detected    Influenza A (RapRVP): Not Detected    Influenza AH1 2009 (RapRVP): Not Detected    Influenza AH1 (RapRVP): Not Detected    Influenza AH3 (RapRVP): Not Detected    Influenza B (RapRVP): Not Detected    Parainfluenza 1 (RapRVP): Not Detected    Parainfluenza 2 (RapRVP): Not Detected    Parainfluenza 3 (RapRVP): Not Detected    Parainfluenza 4 (RapRVP): Not Detected    Resp Syncytial Virus (RapRVP): Not Detected    Chlamydia pneumoniae (RapRVP): Not Detected    Mycoplasma pneumoniae (RapRVP): Not Detected    Entero/Rhinovirus (RapRVP): Not Detected    hMPV (RapRVP): Not Detected    Coronavirus (229E,HKU1,NL63,OC43): Not Detected This Respiratory Panel uses polymerase chain reaction (PCR)  to detect for adenovirus; coronavirus (HKU1, NL63, 229E,  OC43); human metapneumovirus (hMPV); human  enterovirus/rhinovirus (Entero/RV); influenza A; influenza  A/H1: influenza A/H3; influenza A/H1-2009; influenza B;  parainfluenza viruses 1,2,3,4; respiratory syncytial virus;  Mycoplasma pneumoniae; and Chlamydophila pneumoniae.    < from: US Abdomen Limited (02.06.19 @ 18:53) >    IMPRESSION:     No sonographic evidence of acute cholecystitis.    < end of copied text >

## 2019-02-07 NOTE — H&P ADULT - NSHPPHYSICALEXAM_GEN_ALL_CORE
Vital Signs Last 24 Hrs  T(F): 97.6 (07 Feb 2019 14:05), Max: 100.8 (06 Feb 2019 20:15)  HR: 73 (07 Feb 2019 14:05) (71 - 107)  BP: 115/57 (07 Feb 2019 14:05) (97/47 - 132/64)  RR: 16 (07 Feb 2019 14:05) (16 - 18)  SpO2: 99% (07 Feb 2019 14:05) (99% - 100%)    Constitutional: Older woman seen sitting on edge of bed in NAD with large areas of visible, erythematous rash  Eyes: PERRL, sclera clear  ENMT: MMM, 2 small white lesions seen on L side of tongue  Neck: Supple, no cervical LAD  Respiratory: CTAB, no rales, rhonchi or wheezes  Cardiovascular: RRR, no m/g/r  Gastrointestinal: +BS, soft, NT/ND  Neurological: AAOx3  Psychiatric: Appropriate affect and mood  Extremities: No LE edema  Skin: Large areas of coalescing, macular, erythematous, blanching rash seen most prominently on arms, abdomen and upper legs, rash also seen on back, chest, dorsum of feet and palms with smaller macules

## 2019-02-07 NOTE — H&P ADULT - ATTENDING COMMENTS
Patient seen and examined with son and daughter present.  Agree with Above A/p of Dr Woods  , T 100.4 on 2/6/19  Rash- ? etiology with 44 % Bandemia today with WBC 2.31 with ANC 1900  A/P Viral Rash with Leukocytopenia sec to virus - monitor for neutropenia.  R/o Parvo virus.  Agree with Id consult.  Dermatology consult appreciated. await result of biopsy Patient seen and examined with son and daughter present.  Agree with Above A/p of Dr Woods  , T 100.4 on 2/6/19  Rash- ? etiology with 44 % Bandemia today with WBC 2.31 with ANC 1900  A/P Viral Rash with Leukocytopenia sec to virus - monitor for neutropenia.  R/o Parvo virus.  Agree with Id consult.  Dermatology consult appreciated. await result of biopsy.  Airborn isolation.  benadryl prn itching. Patient seen and examined with son and daughter present.  Agree with Above A/p of Dr Woods  , T 100.4 on 2/6/19  Rash- ? etiology with 44 % Bandemia today with WBC 2.31 with ANC 1900  A/P Viral Rash with Leukocytopenia sec to virus - monitor for neutropenia.  R/o Parvo virus.   f/u rubella and other viral panels.  f/u urine and blood cultures.  Agree with Id consult.  Dermatology consult appreciated. await result of biopsy.  Airborn isolation.  benadryl prn itching. Patient seen and examined with son and daughter present.  Agree with Above A/p of Dr Woods  , T 100.4 on 2/6/19  Rash- ? etiology with 44 % Bandemia today with WBC 2.31 with ANC 1900  RVP negative.  T pallidum negative.  A/P Viral Rash with Leukocytopenia sec to virus - monitor for neutropenia.  R/o Parvo virus.   f/u rubella and other viral panels.  f/u urine and blood cultures.  Agree with Id consult.  Dermatology consult appreciated. await result of biopsy.  Airborn isolation.  benadryl prn itching. Patient seen and examined with son and daughter present.  Agree with Above A/p of Dr Woods  , T 100.4 on 2/6/19  Rash- ? etiology with 44 % Bandemia today with WBC 2.31 with ANC 1900  RVP negative.  T pallidum negative.  A/P Viral Rash with Leukocytopenia sec to virus - monitor for neutropenia.  R/o Parvo virus.   f/u rubella and other viral panels.  f/u urine and blood cultures.  Agree with Id consult.  Dermatology consult appreciated. await result of biopsy.  Airborn isolation.  benadryl prn itching.    Addendum  Id consult appreciated.  RVP 2/6 out patient showed flu- start tamiflu 75 mg bid.

## 2019-02-07 NOTE — H&P ADULT - NSHPREVIEWOFSYSTEMS_GEN_ALL_CORE
General: +fatigue; denies dizziness  Eyes: Denies blurry vision  ENMT: +rhinorrhea  Respiratory: +cough; denies SOB  Cardiovascular: Denies palpitations, CP  Gastrointestinal: +abd pain; denies N/V/D/C  : Denies dysuria, increased freq  Musculoskeletal: +joint pain; denies edema  Endocrine: Denies increased thirst, increased frequency  Allergic/Immunologic: +rashes; denies hives  Neuro: Denies weakness, numbness

## 2019-02-07 NOTE — H&P ADULT - HISTORY OF PRESENT ILLNESS
62 yo F with no PMH p/w rash. Pt endorses that 5 days ago, she developed body aches that seemed to mostly go away. The next day she felt feverish and achy and has had aches and fever ever since, developing a high grade fever to 103 at home 2 days ago along with a rash. Pt also endorses arthralgias throughout her body and has noticed a couple of lesions on the side of her tongue. Pt endorses that up until 2 days ago she did not notice any skin changes and then on coming home from work, she found that she had a red rash all over her arms, chest, abdomen and legs. Her face is spared but she has had worsening of the rash with spread to her palms over the past 2 days. She also endorses a mild cough, runny nose and mild, diffuse abdominal pain over this period. 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 She denies any new medication use. She went to urgent care yesterday and was supposedly diagnosed with influenza and was sent to the ED. Pt currently endorses itchiness over her rash, arthralgias but no more fever or muscle pains.    ED:  Initial VS - Tm 100.8, , /64, RR 16, SpO2 100%  Pt admitted to CDU, given Solumedrol 125  Repeat CBC showing increased bands from 8% to 44%  Derm and ID consulted

## 2019-02-07 NOTE — CONSULT NOTE ADULT - SUBJECTIVE AND OBJECTIVE BOX
Patient is a 63y old  Female who presents with a chief complaint of bandemia, rash (07 Feb 2019 13:01)    HPI:      prior hospital charts reviewed [  ]  primary team notes reviewed [  ]  other consultant notes reviewed [  ]    PAST MEDICAL & SURGICAL HISTORY:  No pertinent past medical history  History of appendectomy      Allergies  penicillin (Rash)        ANTIMICROBIALS (past 90 days)  MEDICATIONS  (STANDING):        ANTIMICROBIALS:        OTHER MEDS: MEDICATIONS  (STANDING):  diphenhydrAMINE   Injectable 25 every 6 hours PRN  ibuprofen  Tablet. 600 every 6 hours PRN      SOCIAL HISTORY:   hx smoking  non-smoker    FAMILY HISTORY:  No pertinent family history in first degree relatives      REVIEW OF SYSTEMS  [  ] ROS unobtainable because:    [  ] All other systems negative except as noted below:	    Constitutional:  [ ] fever [ ] chills  [ ] weight loss  [ ] weakness  Skin:  [ ] rash [ ] phlebitis	  Eyes: [ ] icterus [ ] pain  [ ] discharge	  ENMT: [ ] sore throat  [ ] thrush [ ] ulcers [ ] exudates  Respiratory: [ ] dyspnea [ ] hemoptysis [ ] cough [ ] sputum	  Cardiovascular:  [ ] chest pain [ ] palpitations [ ] edema	  Gastrointestinal:  [ ] nausea [ ] vomiting [ ] diarrhea [ ] constipation [ ] pain	  Genitourinary:  [ ] dysuria [ ] frequency [ ] hematuria [ ] discharge [ ] flank pain  [ ] incontinence  Musculoskeletal:  [ ] myalgias [ ] arthralgias [ ] arthritis  [ ] back pain  Neurological:  [ ] headache [ ] seizures  [ ] confusion/altered mental status  Psychiatric:  [ ] anxiety [ ] depression	  Hematology/Lymphatics:  [ ] lymphadenopathy  Endocrine:  [ ] adrenal [ ] thyroid  Allergic/Immunologic:	 [ ] transplant [ ] seasonal    Vital Signs Last 24 Hrs  T(F): 97.6 (02-07-19 @ 14:05), Max: 100.8 (02-06-19 @ 20:15)    Vital Signs Last 24 Hrs  HR: 73 (02-07-19 @ 14:05) (71 - 107)  BP: 115/57 (02-07-19 @ 14:05) (97/47 - 132/64)  RR: 16 (02-07-19 @ 14:05)  SpO2: 99% (02-07-19 @ 14:05) (99% - 100%)  Wt(kg): --    PHYSICAL EXAM:  General: non-toxic  HEAD/EYES: anicteric, PERRL  ENT:  supple  Cardiovascular:   S1, S2  Respiratory:  clear bilaterally  GI:  soft, non-tender, normal bowel sounds  :  no CVA tenderness   Musculoskeletal:  no synovitis  Neurologic:  grossly non-focal  Skin:  no rash  Lymph: no lymphadenopathy  Psychiatric:  appropriate affect  Vascular:  no phlebitis                                11.4   2.31  )-----------( 82       ( 07 Feb 2019 07:12 )             35.6     02-07    142  |  109<H>  |  12  ----------------------------<  162<H>  4.0   |  22  |  0.57    Ca    8.0<L>      07 Feb 2019 07:12    TPro  5.9<L>  /  Alb  3.2<L>  /  TBili  0.2  /  DBili  x   /  AST  31  /  ALT  27  /  AlkPhos  67  02-07          MICROBIOLOGY:                            RADIOLOGY:  imaging below personally reviewed Patient is a 63y old  Female who presents with a chief complaint of bandemia, rash (2019 13:01)    HPI: 62 yo female no significant pmh presents with rash and flu like symptoms. Says she noticed rash on Tuesday after going to urgent care for fevers. She was also experiencing chills and nausea. Says the rash appearance within the course of the day Tuesday and denies any pruritis. Pt says she has not done any recent traveling. Is in contact with a sick older male w/ cough. Denies taking any new medications, supplements.      prior hospital charts reviewed [ X ]  primary team notes reviewed [ X ]  other consultant notes reviewed [X ]    PAST MEDICAL & SURGICAL HISTORY   No pertinent past medical history  History of appendectomy at 12       Allergies  penicillin (Rash)        ANTIMICROBIALS (past 90 days)  MEDICATIONS  (STANDING):        ANTIMICROBIALS:        OTHER MEDS: MEDICATIONS  (STANDING):  diphenhydrAMINE   Injectable 25 every 6 hours PRN  ibuprofen  Tablet. 600 every 6 hours PRN      SOCIAL HISTORY:   hx smoking  non-smoker    FAMILY HISTORY:  Mother - Age 85 CAD  Father - Age 90 Cancer- unkown     REVIEW OF SYSTEMS  [  ] ROS unobtainable because:    [  ] All other systems negative except as noted below:	    Constitutional:  [ X] fever [ X] chills  [ ] weight loss  [X ] weakness  Skin:  [ X] rash [ ] phlebitis	  Eyes: [ ] icterus [ ] pain  [ ] discharge	  ENMT: [ ] sore throat  [ ] thrush [ ] ulcers [ ] exudates  Respiratory: [ ] dyspnea [ ] hemoptysis [ ] cough [ ] sputum	  Cardiovascular:  [ ] chest pain [ ] palpitations [ ] edema	  Gastrointestinal:  [X ] nausea [ ] vomiting [ ] diarrhea [ ] constipation [ ] pain	  Genitourinary:  [ ] dysuria [ ] frequency [ ] hematuria [ ] discharge [ ] flank pain  [ ] incontinence  Musculoskeletal:  [ ] myalgias [ ] arthralgias [ ] arthritis  [ ] back pain  Neurological:  [ ] headache [ ] seizures  [ ] confusion/altered mental status  Psychiatric:  [ ] anxiety [ ] depression	  Hematology/Lymphatics:  [ ] lymphadenopathy  Endocrine:  [ ] adrenal [ ] thyroid  Allergic/Immunologic:	 [ ] transplant [ ] seasonal    Vital Signs Last 24 Hrs  T(F): 97.6 (19 @ 14:05), Max: 100.8 (19 @ 20:15)    Vital Signs Last 24 Hrs  HR: 73 (19 @ 14:05) (71 - 107)  BP: 115/57 (19 @ 14:05) (97/47 - 132/64)  RR: 16 (19 @ 14:05)  SpO2: 99% (19 @ 14:05) (99% - 100%)  Wt(kg): --    PHYSICAL EXAM:  General: non-toxic  HEAD/EYES: anicteric, PERRL  ENT:  supple  Cardiovascular:   S1, S2  Respiratory:  clear bilaterally  GI:  soft, non-tender, normal bowel sounds  :  no CVA tenderness   Musculoskeletal:  no synovitis  Neurologic:  grossly non-focal  Skin: maculopapular rash extending from trunk to all 4 extremities   Lymph: no lymphadenopathy  Psychiatric:  appropriate affect  Vascular:  no phlebitis                                11.4   2.31  )-----------( 82       ( 2019 07:12 )             35.6     02-07    142  |  109<H>  |  12  ----------------------------<  162<H>  4.0   |  22  |  0.57    Ca    8.0<L>      2019 07:12    TPro  5.9<L>  /  Alb  3.2<L>  /  TBili  0.2  /  DBili  x   /  AST  31  /  ALT  27  /  AlkPhos  67            MICROBIOLOGY:                            RADIOLOGY:  imaging below personally reviewed

## 2019-02-07 NOTE — CONSULT NOTE ADULT - ASSESSMENT
62 yo F with confluent erythematous 62 yo otherwise healthy F with confluent erythematous patches some with central dusky appearance in the setting of fever, URI Sx, ddx includes infectious etiology such as viral exanthem vs. mycoplasma vs. toxin mediated eruption vs. drug eruption such as morbilliform drug eruption vs. early SJS.  The fact that this patient has no new medication exposures aside from ibuprofen, makes drug eruption less likely.       1. Confluent erythematous patches - ddx as above   -Continue to trend CBC, CMP  -Obtain mycoplasma IgM/IgG, EBV IgG/IgM  -Measles very low on ddx, patient reports she is immune and had testing ~3 years ago for job, but recommend obtaining measles IgG/IgM and placing patient on proper isolation as precaution   -4mm punch bx performed today from left back for dx   -Dermatology will continue to follow   -Please notify dermatology on call immediately if patient develops eye pain, dysuria, worsening mucosal involvement, skin sloughing, or bullae as these can be signs of serious drug eruption    PUNCH BIOPSY PROCEDURE NOTE  Punch biopsy diameter: 4mm  Sutures used: Gelfoam   Alternatives to this procedure, benefits of, and risks including bleeding, scarring, and infection were explained to the patient. Informed consent was documented and a consent form was signed by the patient and surgeon. The lesion was cleaned with alcohol and anesthetized with 1% lidocaine with epinephrine. A sterile punch instrument was used to incise a circular piece of tissue into the superficial subcutaneous plane, and the wound was closed with sutures above. The specimen was sent to pathology. Vaseline and a bandage were applied. The patient tolerated the procedure well with minimal blood loss and no complications. Post-op instructions were given. There were no pre-op or post-op medications. The patient was instructed to keep the biopsy site dry and covered for 24 hours.  After that, they may wash gently the area daily with mild soap and water and apply clean vaseline and a Bandaid until it has healed.  The patient was advised to contact us if they develop persistent bleeding, redness, swelling, increasing pain, or pus draining from the site. Patient's phone number was obtained for reporting of results.  Patient agreed to leaving of message with results if phone is not answered.      Nora Pickering MD (PGY-3)   Dermatology Resident  [facility]  Pager: [phone]  Office [phone]

## 2019-02-07 NOTE — ED CDU PROVIDER SUBSEQUENT DAY NOTE - HISTORY
63yF w/no pmhx presenting with diffuse body rash in the setting of 3 days of flu-like symptoms. Pt reported 3 days of body aches, chills, sore throat, cough and nausea, then she developed a rash to her arms/legs/trunk/back/palms and soles. Pt was seen at  before the rash erupted and was diagnosed with the flu from a nasal swab. Pt sent to CDU for dermatology consult.

## 2019-02-08 DIAGNOSIS — Z91.012 ALLERGY TO EGGS: ICD-10-CM

## 2019-02-08 DIAGNOSIS — D72.819 DECREASED WHITE BLOOD CELL COUNT, UNSPECIFIED: ICD-10-CM

## 2019-02-08 DIAGNOSIS — J11.1 INFLUENZA DUE TO UNIDENTIFIED INFLUENZA VIRUS WITH OTHER RESPIRATORY MANIFESTATIONS: ICD-10-CM

## 2019-02-08 LAB
ALBUMIN SERPL ELPH-MCNC: 3 G/DL — LOW (ref 3.3–5)
ALP SERPL-CCNC: 58 U/L — SIGNIFICANT CHANGE UP (ref 40–120)
ALT FLD-CCNC: 28 U/L — SIGNIFICANT CHANGE UP (ref 4–33)
ANION GAP SERPL CALC-SCNC: 13 MMO/L — SIGNIFICANT CHANGE UP (ref 7–14)
AST SERPL-CCNC: 30 U/L — SIGNIFICANT CHANGE UP (ref 4–32)
BASOPHILS # BLD AUTO: 0.01 K/UL — SIGNIFICANT CHANGE UP (ref 0–0.2)
BASOPHILS NFR BLD AUTO: 0.1 % — SIGNIFICANT CHANGE UP (ref 0–2)
BILIRUB SERPL-MCNC: < 0.2 MG/DL — LOW (ref 0.2–1.2)
BUN SERPL-MCNC: 9 MG/DL — SIGNIFICANT CHANGE UP (ref 7–23)
CALCIUM SERPL-MCNC: 7.9 MG/DL — LOW (ref 8.4–10.5)
CHLORIDE SERPL-SCNC: 108 MMOL/L — HIGH (ref 98–107)
CO2 SERPL-SCNC: 22 MMOL/L — SIGNIFICANT CHANGE UP (ref 22–31)
CREAT SERPL-MCNC: 0.54 MG/DL — SIGNIFICANT CHANGE UP (ref 0.5–1.3)
EBV EA AB TITR SER IF: POSITIVE — SIGNIFICANT CHANGE UP
EBV EA IGG SER-ACNC: NEGATIVE — SIGNIFICANT CHANGE UP
EBV PATRN SPEC IB-IMP: SIGNIFICANT CHANGE UP
EBV VCA IGG AVIDITY SER QL IA: POSITIVE — SIGNIFICANT CHANGE UP
EBV VCA IGM TITR FLD: NEGATIVE — SIGNIFICANT CHANGE UP
EOSINOPHIL # BLD AUTO: 0.16 K/UL — SIGNIFICANT CHANGE UP (ref 0–0.5)
EOSINOPHIL NFR BLD AUTO: 2.2 % — SIGNIFICANT CHANGE UP (ref 0–6)
GLUCOSE SERPL-MCNC: 78 MG/DL — SIGNIFICANT CHANGE UP (ref 70–99)
HCT VFR BLD CALC: 34.9 % — SIGNIFICANT CHANGE UP (ref 34.5–45)
HCV AB S/CO SERPL IA: 0.13 S/CO — SIGNIFICANT CHANGE UP
HCV AB SERPL-IMP: SIGNIFICANT CHANGE UP
HGB BLD-MCNC: 10.9 G/DL — LOW (ref 11.5–15.5)
IMM GRANULOCYTES NFR BLD AUTO: 0.6 % — SIGNIFICANT CHANGE UP (ref 0–1.5)
LYMPHOCYTES # BLD AUTO: 1.03 K/UL — SIGNIFICANT CHANGE UP (ref 1–3.3)
LYMPHOCYTES # BLD AUTO: 14.4 % — SIGNIFICANT CHANGE UP (ref 13–44)
M PNEUMO IGG SER IA-ACNC: 1.13 INDEX — HIGH
M PNEUMO IGG SER IA-ACNC: POSITIVE — SIGNIFICANT CHANGE UP
MAGNESIUM SERPL-MCNC: 1.9 MG/DL — SIGNIFICANT CHANGE UP (ref 1.6–2.6)
MCHC RBC-ENTMCNC: 25.3 PG — LOW (ref 27–34)
MCHC RBC-ENTMCNC: 31.2 % — LOW (ref 32–36)
MCV RBC AUTO: 81 FL — SIGNIFICANT CHANGE UP (ref 80–100)
MEV IGG SER-ACNC: 120 AU/ML — SIGNIFICANT CHANGE UP
MEV IGG+IGM SER-IMP: POSITIVE — SIGNIFICANT CHANGE UP
MONOCYTES # BLD AUTO: 0.42 K/UL — SIGNIFICANT CHANGE UP (ref 0–0.9)
MONOCYTES NFR BLD AUTO: 5.9 % — SIGNIFICANT CHANGE UP (ref 2–14)
NEUTROPHILS # BLD AUTO: 5.49 K/UL — SIGNIFICANT CHANGE UP (ref 1.8–7.4)
NEUTROPHILS NFR BLD AUTO: 76.8 % — SIGNIFICANT CHANGE UP (ref 43–77)
NRBC # FLD: 0 K/UL — LOW (ref 25–125)
PHOSPHATE SERPL-MCNC: 1 MG/DL — CRITICAL LOW (ref 2.5–4.5)
PLATELET # BLD AUTO: 116 K/UL — LOW (ref 150–400)
PMV BLD: 12.1 FL — SIGNIFICANT CHANGE UP (ref 7–13)
POTASSIUM SERPL-MCNC: 3.4 MMOL/L — LOW (ref 3.5–5.3)
POTASSIUM SERPL-SCNC: 3.4 MMOL/L — LOW (ref 3.5–5.3)
PROT SERPL-MCNC: 5.6 G/DL — LOW (ref 6–8.3)
RBC # BLD: 4.31 M/UL — SIGNIFICANT CHANGE UP (ref 3.8–5.2)
RBC # FLD: 13.3 % — SIGNIFICANT CHANGE UP (ref 10.3–14.5)
SODIUM SERPL-SCNC: 143 MMOL/L — SIGNIFICANT CHANGE UP (ref 135–145)
SPECIMEN SOURCE: SIGNIFICANT CHANGE UP
SPECIMEN SOURCE: SIGNIFICANT CHANGE UP
WBC # BLD: 7.15 K/UL — SIGNIFICANT CHANGE UP (ref 3.8–10.5)
WBC # FLD AUTO: 7.15 K/UL — SIGNIFICANT CHANGE UP (ref 3.8–10.5)

## 2019-02-08 PROCEDURE — 99222 1ST HOSP IP/OBS MODERATE 55: CPT

## 2019-02-08 PROCEDURE — 99232 SBSQ HOSP IP/OBS MODERATE 35: CPT

## 2019-02-08 RX ORDER — POTASSIUM PHOSPHATE, MONOBASIC POTASSIUM PHOSPHATE, DIBASIC 236; 224 MG/ML; MG/ML
30 INJECTION, SOLUTION INTRAVENOUS ONCE
Qty: 0 | Refills: 0 | Status: COMPLETED | OUTPATIENT
Start: 2019-02-08 | End: 2019-02-08

## 2019-02-08 RX ORDER — ACETAMINOPHEN 500 MG
1000 TABLET ORAL ONCE
Qty: 0 | Refills: 0 | Status: COMPLETED | OUTPATIENT
Start: 2019-02-08 | End: 2019-02-08

## 2019-02-08 RX ADMIN — Medication 75 MILLIGRAM(S): at 05:57

## 2019-02-08 RX ADMIN — SODIUM CHLORIDE 150 MILLILITER(S): 9 INJECTION INTRAMUSCULAR; INTRAVENOUS; SUBCUTANEOUS at 22:53

## 2019-02-08 RX ADMIN — SODIUM CHLORIDE 150 MILLILITER(S): 9 INJECTION INTRAMUSCULAR; INTRAVENOUS; SUBCUTANEOUS at 01:16

## 2019-02-08 RX ADMIN — Medication 400 MILLIGRAM(S): at 22:34

## 2019-02-08 RX ADMIN — POTASSIUM PHOSPHATE, MONOBASIC POTASSIUM PHOSPHATE, DIBASIC 85 MILLIMOLE(S): 236; 224 INJECTION, SOLUTION INTRAVENOUS at 20:22

## 2019-02-08 RX ADMIN — Medication 75 MILLIGRAM(S): at 18:23

## 2019-02-08 NOTE — ED ADULT NURSE REASSESSMENT NOTE - NS ED NURSE REASSESS COMMENT FT1
Report received from RACH Velazquez. Pt. received A&Ox4, ambulatory, with 22 gauge IV in right hand. No acute distress at present. Respirations are even and unlabored on room air. VS as noted. Call bell within reach. Will continue to monitor. Report received from RACH Velazquez. Pt. received A&Ox4, ambulatory, with 22 gauge IV in right hand, and red blotchy rash all over body. Pt. states the rash is not worse than before, denies sensation of throat closing and hoarse voice. No drooling, stridor, or gurgling noted. No acute distress at present. Respirations are even and unlabored on room air. VS as noted. Call bell within reach. Will continue to monitor.

## 2019-02-08 NOTE — PROGRESS NOTE ADULT - ASSESSMENT
63 year old F w/ no significant PMH w/ flu like symptoms + maculopapular rash for 3 days. Pt was febrile in ED to 100.8 and has remained afebrile since. Pt is leukopenic w/ elevated bands. RVP neg, CXR neg, CT ABD neg, syphilis neg.

## 2019-02-08 NOTE — CHART NOTE - NSCHARTNOTEFT_GEN_A_CORE
Patient seen today with attending.  Rash more confluent and conjunctival injection worsened.  Preliminary punch bx results showing interface dermatitis, sparse perivascular lymphs and rare eos.  Morbilliform drug eruption vs. viral exanthem.  Biopsy does NOT suggest SJS/TEN.  Dermatology will continue to follow patient.  Please notify dermatology on call immediately if patient develops skin pain, dysuria, genital/oral ulcerations, skin sloughing, or bullae as these can be signs of more serious drug eruption.    Nora Pickering MD (PGY-3)   Dermatology Resident  [facility]  Pager: [phone]  Office [phone] Patient seen today with attending Dr. Rivas.  Rash more confluent and conjunctival injection worsened.  Preliminary punch bx results showing interface dermatitis, sparse perivascular lymphs and rare eos.  Morbilliform drug eruption vs. viral exanthem.  Biopsy does NOT suggest SJS/TEN.  Dermatology will continue to follow patient.  Please notify dermatology on call immediately if patient develops skin pain, dysuria, genital/oral ulcerations, skin sloughing, or bullae as these can be signs of more serious drug eruption.    Nora Pickering MD (PGY-3)   Dermatology Resident  [facility]  Pager: [phone]  Office [phone]

## 2019-02-08 NOTE — PROGRESS NOTE ADULT - SUBJECTIVE AND OBJECTIVE BOX
DARREN CHÁVEZ 63y [id number]    Patient is a 63y old  Female who presents with a chief complaint of Rash (2019 14:53)      Follow Up/CC:  ID following for flu    Interval History/ROS: tired, no fever    Allergies    penicillin (Rash)    Intolerances        ANTIMICROBIALS:  oseltamivir 75 two times a day      MEDICATIONS  (STANDING):  oseltamivir 75 milliGRAM(s) Oral two times a day  sodium chloride 0.9%. 1000 milliLiter(s) (150 mL/Hr) IV Continuous <Continuous>    MEDICATIONS  (PRN):  acetaminophen   Tablet .. 650 milliGRAM(s) Oral every 6 hours PRN Temp greater or equal to 38C (100.4F), Mild Pain (1 - 3), Moderate Pain (4 - 6)  diphenhydrAMINE   Injectable 25 milliGRAM(s) IV Push every 6 hours PRN Itching        Vital Signs Last 24 Hrs  T(C): 37.5 (2019 12:03), Max: 37.5 (2019 12:03)  T(F): 99.5 (2019 12:03), Max: 99.5 (2019 12:03)  HR: 93 (2019 12:03) (70 - 93)  BP: 129/59 (2019 05:17) (110/64 - 135/49)  BP(mean): 65 (2019 12:03) (65 - 65)  RR: 18 (2019 12:03) (16 - 19)  SpO2: 98% (2019 12:03) (98% - 100%)    CBC Full  -  ( 2019 11:50 )  WBC Count : 7.15 K/uL  Hemoglobin : 10.9 g/dL  Hematocrit : 34.9 %  Platelet Count - Automated : 116 K/uL  Mean Cell Volume : 81.0 fL  Mean Cell Hemoglobin : 25.3 pg  Mean Cell Hemoglobin Concentration : 31.2 %  Auto Neutrophil # : 5.49 K/uL  Auto Lymphocyte # : 1.03 K/uL  Auto Monocyte # : 0.42 K/uL  Auto Eosinophil # : 0.16 K/uL  Auto Basophil # : 0.01 K/uL  Auto Neutrophil % : 76.8 %  Auto Lymphocyte % : 14.4 %  Auto Monocyte % : 5.9 %  Auto Eosinophil % : 2.2 %  Auto Basophil % : 0.1 %        142  |  109<H>  |  12  ----------------------------<  162<H>  4.0   |  22  |  0.57    Ca    8.0<L>      2019 07:12    TPro  5.9<L>  /  Alb  3.2<L>  /  TBili  0.2  /  DBili  x   /  AST  31  /  ALT  27  /  AlkPhos  67  02-07    LIVER FUNCTIONS - ( 2019 07:12 )  Alb: 3.2 g/dL / Pro: 5.9 g/dL / ALK PHOS: 67 u/L / ALT: 27 u/L / AST: 31 u/L / GGT: x           Urinalysis Basic - ( 2019 20:09 )    Color: LIGHT YELLOW / Appearance: CLEAR / S.019 / pH: 6.5  Gluc: NEGATIVE / Ketone: NEGATIVE  / Bili: NEGATIVE / Urobili: NORMAL   Blood: NEGATIVE / Protein: 20 / Nitrite: NEGATIVE   Leuk Esterase: TRACE / RBC: 0-2 / WBC 0-2   Sq Epi: FEW / Non Sq Epi: x / Bacteria: NEGATIVE        MICROBIOLOGY:    Rapid Respiratory Viral Panel (19 @ 20:09)    Adenovirus (RapRVP): Not Detected    Influenza A (RapRVP): Not Detected    Influenza AH1 2009 (RapRVP): Not Detected    Influenza AH1 (RapRVP): Not Detected    Influenza AH3 (RapRVP): Not Detected    Influenza B (RapRVP): Not Detected    Parainfluenza 1 (RapRVP): Not Detected    Parainfluenza 2 (RapRVP): Not Detected    Parainfluenza 3 (RapRVP): Not Detected    Parainfluenza 4 (RapRVP): Not Detected    Resp Syncytial Virus (RapRVP): Not Detected    Chlamydia pneumoniae (RapRVP): Not Detected    Mycoplasma pneumoniae (RapRVP): Not Detected    Entero/Rhinovirus (RapRVP): Not Detected    hMPV (RapRVP): Not Detected    Coronavirus (229E,HKU1,NL63,OC43): Not Detected This Respiratory Panel uses polymerase chain reaction (PCR)  to detect for adenovirus; coronavirus (HKU1, NL63, 229E,  OC43); human metapneumovirus (hMPV); human  enterovirus/rhinovirus (Entero/RV); influenza A; influenza  A/H1: influenza A/H3; influenza A/H1-2009; influenza B;  parainfluenza viruses 1,2,3,4; respiratory syncytial virus;  Mycoplasma pneumoniae; and Chlamydophila pneumoniae.          RADIOLOGY    < from: US Abdomen Limited (19 @ 18:53) >  No sonographic evidence of acute cholecystitis.    < end of copied text >

## 2019-02-08 NOTE — PROGRESS NOTE ADULT - SUBJECTIVE AND OBJECTIVE BOX
Patient is a 63y old  Female who presents with a chief complaint of Rash (2019 12:08)      SUBJECTIVE / OVERNIGHT EVENTS:    Events noted.  Rash    MEDICATIONS  (STANDING):  acetaminophen  IVPB .. 1000 milliGRAM(s) IV Intermittent once  oseltamivir 75 milliGRAM(s) Oral two times a day  sodium chloride 0.9%. 1000 milliLiter(s) (150 mL/Hr) IV Continuous <Continuous>    MEDICATIONS  (PRN):  acetaminophen   Tablet .. 650 milliGRAM(s) Oral every 6 hours PRN Temp greater or equal to 38C (100.4F), Mild Pain (1 - 3), Moderate Pain (4 - 6)  diphenhydrAMINE   Injectable 25 milliGRAM(s) IV Push every 6 hours PRN Itching        CAPILLARY BLOOD GLUCOSE        I&O's Summary      PHYSICAL EXAM:  GENERAL: NAD  NECK: Supple, No JVD  CHEST/LUNG: Clear to auscultation bilaterally; No wheezing.  HEART: Regular rate and rhythm; No murmurs, rubs, or gallops  ABDOMEN: Soft, Nontender, Nondistended; Bowel sounds present  EXTREMITIES:   Maculopapular Rash  NEUROLOGY: AAO X 3      LABS:                        10.9   7.15  )-----------( 116      ( 2019 11:50 )             34.9     02-08    143  |  108<H>  |  9   ----------------------------<  78  3.4<L>   |  22  |  0.54    Ca    7.9<L>      2019 11:50  Phos  1.0     -08  Mg     1.9     -08    TPro  5.6<L>  /  Alb  3.0<L>  /  TBili  < 0.2<L>  /  DBili  x   /  AST  30  /  ALT  28  /  AlkPhos  58  02-08          Urinalysis Basic - ( 2019 20:09 )    Color: LIGHT YELLOW / Appearance: CLEAR / S.019 / pH: 6.5  Gluc: NEGATIVE / Ketone: NEGATIVE  / Bili: NEGATIVE / Urobili: NORMAL   Blood: NEGATIVE / Protein: 20 / Nitrite: NEGATIVE   Leuk Esterase: TRACE / RBC: 0-2 / WBC 0-2   Sq Epi: FEW / Non Sq Epi: x / Bacteria: NEGATIVE      CAPILLARY BLOOD GLUCOSE         @ 13:30  Culture-urine --  Culture results --  method type --  Organism --  Organism Identification --  Specimen source BLOOD PERIPHERAL            @ 13:30  Culture blood   NO ORGANISMS ISOLATED  NO ORGANISMS ISOLATED AT 24 HOURS  Culture results --  Gram stain --  Gram stain blood --  Method type --  Organism --  Organism identification --  Specimen source BLOOD PERIPHERAL      RADIOLOGY & ADDITIONAL TESTS:    Imaging Personally Reviewed:    Consultant(s) Notes Reviewed:      Care Discussed with Consultants/Other Providers:

## 2019-02-08 NOTE — PROGRESS NOTE ADULT - ASSESSMENT
· Assessment	  62 yo F with no PMH p/w diffuse rash and high fevers of unclear etiology but likely 2/2 viral exanthem vs. drug eruption.     Problem/Plan - 1:  ·  Problem: Rash.  Plan: Derm f/up noted. F/up with skin biopsy.    Flu like symptoms:  Tamiflu  ID f/up noted

## 2019-02-09 LAB
ANION GAP SERPL CALC-SCNC: 12 MMO/L — SIGNIFICANT CHANGE UP (ref 7–14)
ANISOCYTOSIS BLD QL: SLIGHT — SIGNIFICANT CHANGE UP
B19V DNA FLD QL NAA+PROBE: SIGNIFICANT CHANGE UP IU/ML
BASOPHILS # BLD AUTO: 0.01 K/UL — SIGNIFICANT CHANGE UP (ref 0–0.2)
BASOPHILS NFR BLD AUTO: 0.2 % — SIGNIFICANT CHANGE UP (ref 0–2)
BASOPHILS NFR SPEC: 0 % — SIGNIFICANT CHANGE UP (ref 0–2)
BLASTS # FLD: 0 % — SIGNIFICANT CHANGE UP (ref 0–0)
BUN SERPL-MCNC: 6 MG/DL — LOW (ref 7–23)
CALCIUM SERPL-MCNC: 7.3 MG/DL — LOW (ref 8.4–10.5)
CHLORIDE SERPL-SCNC: 109 MMOL/L — HIGH (ref 98–107)
CO2 SERPL-SCNC: 22 MMOL/L — SIGNIFICANT CHANGE UP (ref 22–31)
CREAT SERPL-MCNC: 0.56 MG/DL — SIGNIFICANT CHANGE UP (ref 0.5–1.3)
EOSINOPHIL # BLD AUTO: 0.24 K/UL — SIGNIFICANT CHANGE UP (ref 0–0.5)
EOSINOPHIL NFR BLD AUTO: 4.9 % — SIGNIFICANT CHANGE UP (ref 0–6)
EOSINOPHIL NFR FLD: 3.5 % — SIGNIFICANT CHANGE UP (ref 0–6)
GIANT PLATELETS BLD QL SMEAR: PRESENT — SIGNIFICANT CHANGE UP
GLUCOSE SERPL-MCNC: 83 MG/DL — SIGNIFICANT CHANGE UP (ref 70–99)
HCT VFR BLD CALC: 30.1 % — LOW (ref 34.5–45)
HGB BLD-MCNC: 9.8 G/DL — LOW (ref 11.5–15.5)
HYPOCHROMIA BLD QL: SLIGHT — SIGNIFICANT CHANGE UP
IMM GRANULOCYTES NFR BLD AUTO: 0.6 % — SIGNIFICANT CHANGE UP (ref 0–1.5)
LYMPHOCYTES # BLD AUTO: 1.29 K/UL — SIGNIFICANT CHANGE UP (ref 1–3.3)
LYMPHOCYTES # BLD AUTO: 26.3 % — SIGNIFICANT CHANGE UP (ref 13–44)
LYMPHOCYTES NFR SPEC AUTO: 14.9 % — SIGNIFICANT CHANGE UP (ref 13–44)
MAGNESIUM SERPL-MCNC: 1.6 MG/DL — SIGNIFICANT CHANGE UP (ref 1.6–2.6)
MCHC RBC-ENTMCNC: 26.1 PG — LOW (ref 27–34)
MCHC RBC-ENTMCNC: 32.6 % — SIGNIFICANT CHANGE UP (ref 32–36)
MCV RBC AUTO: 80.1 FL — SIGNIFICANT CHANGE UP (ref 80–100)
METAMYELOCYTES # FLD: 0 % — SIGNIFICANT CHANGE UP (ref 0–1)
MEV IGM SER-ACNC: NEGATIVE — SIGNIFICANT CHANGE UP
MICROCYTES BLD QL: SLIGHT — SIGNIFICANT CHANGE UP
MONOCYTES # BLD AUTO: 0.48 K/UL — SIGNIFICANT CHANGE UP (ref 0–0.9)
MONOCYTES NFR BLD AUTO: 9.8 % — SIGNIFICANT CHANGE UP (ref 2–14)
MONOCYTES NFR BLD: 3.5 % — SIGNIFICANT CHANGE UP (ref 2–9)
MYELOCYTES NFR BLD: 0 % — SIGNIFICANT CHANGE UP (ref 0–0)
NEUTROPHIL AB SER-ACNC: 67.6 % — SIGNIFICANT CHANGE UP (ref 43–77)
NEUTROPHILS # BLD AUTO: 2.85 K/UL — SIGNIFICANT CHANGE UP (ref 1.8–7.4)
NEUTROPHILS NFR BLD AUTO: 58.2 % — SIGNIFICANT CHANGE UP (ref 43–77)
NEUTS BAND # BLD: 7 % — HIGH (ref 0–6)
NRBC # FLD: 0 K/UL — LOW (ref 25–125)
OTHER - HEMATOLOGY %: 0 — SIGNIFICANT CHANGE UP
PHOSPHATE SERPL-MCNC: 2.7 MG/DL — SIGNIFICANT CHANGE UP (ref 2.5–4.5)
PLATELET # BLD AUTO: 123 K/UL — LOW (ref 150–400)
PLATELET COUNT - ESTIMATE: SIGNIFICANT CHANGE UP
PMV BLD: 11.5 FL — SIGNIFICANT CHANGE UP (ref 7–13)
POIKILOCYTOSIS BLD QL AUTO: SLIGHT — SIGNIFICANT CHANGE UP
POTASSIUM SERPL-MCNC: 3.1 MMOL/L — LOW (ref 3.5–5.3)
POTASSIUM SERPL-SCNC: 3.1 MMOL/L — LOW (ref 3.5–5.3)
PROMYELOCYTES # FLD: 0 % — SIGNIFICANT CHANGE UP (ref 0–0)
RBC # BLD: 3.76 M/UL — LOW (ref 3.8–5.2)
RBC # FLD: 13.4 % — SIGNIFICANT CHANGE UP (ref 10.3–14.5)
SMUDGE CELLS # BLD: PRESENT — SIGNIFICANT CHANGE UP
SODIUM SERPL-SCNC: 143 MMOL/L — SIGNIFICANT CHANGE UP (ref 135–145)
VARIANT LYMPHS # BLD: 3.5 % — SIGNIFICANT CHANGE UP
WBC # BLD: 4.9 K/UL — SIGNIFICANT CHANGE UP (ref 3.8–10.5)
WBC # FLD AUTO: 4.9 K/UL — SIGNIFICANT CHANGE UP (ref 3.8–10.5)

## 2019-02-09 PROCEDURE — 99232 SBSQ HOSP IP/OBS MODERATE 35: CPT

## 2019-02-09 RX ORDER — POTASSIUM CHLORIDE 20 MEQ
40 PACKET (EA) ORAL ONCE
Qty: 0 | Refills: 0 | Status: COMPLETED | OUTPATIENT
Start: 2019-02-09 | End: 2019-02-09

## 2019-02-09 RX ADMIN — Medication 650 MILLIGRAM(S): at 21:52

## 2019-02-09 RX ADMIN — Medication 650 MILLIGRAM(S): at 12:39

## 2019-02-09 RX ADMIN — Medication 30 MILLILITER(S): at 15:32

## 2019-02-09 RX ADMIN — SODIUM CHLORIDE 150 MILLILITER(S): 9 INJECTION INTRAMUSCULAR; INTRAVENOUS; SUBCUTANEOUS at 21:38

## 2019-02-09 RX ADMIN — Medication 75 MILLIGRAM(S): at 18:26

## 2019-02-09 RX ADMIN — Medication 75 MILLIGRAM(S): at 06:02

## 2019-02-09 RX ADMIN — Medication 650 MILLIGRAM(S): at 22:52

## 2019-02-09 RX ADMIN — SODIUM CHLORIDE 150 MILLILITER(S): 9 INJECTION INTRAMUSCULAR; INTRAVENOUS; SUBCUTANEOUS at 12:40

## 2019-02-09 RX ADMIN — Medication 650 MILLIGRAM(S): at 06:19

## 2019-02-09 RX ADMIN — Medication 40 MILLIEQUIVALENT(S): at 12:39

## 2019-02-09 RX ADMIN — Medication 650 MILLIGRAM(S): at 13:27

## 2019-02-09 NOTE — PROGRESS NOTE ADULT - ASSESSMENT
· Assessment	  62 yo F with no PMH p/w diffuse rash and high fevers of unclear etiology but likely 2/2 viral exanthem vs. drug eruption.     Problem/Plan - 1:  ·  Problem: Rash.  Plan:  Skin biopsy: Meds vs viral inf.    Flu like symptoms:  Tamiflu  ID f/up     Dw family

## 2019-02-09 NOTE — PROGRESS NOTE ADULT - ASSESSMENT
63 year old F w/ no significant PMH w/ flu like symptoms + maculopapular rash for 3 days. Pt was febrile in ED to 100.8 and has remained afebrile since. Pt is leukopenic w/ elevated bands. RVP neg, CXR neg, CT ABD neg, syphilis neg.   Resolved leucopenia, improving bands, rash improving, fevers.   Clinically improved.   Skin bx consistent with drug eruption, viral exanthem is a possibility too.       Plan: 63 year old F w/ no significant PMH w/ flu like symptoms + maculopapular rash for 3 days. Pt was febrile in ED to 100.8 and has remained afebrile since. Pt is leukopenic w/ elevated bands. RVP neg, CXR neg, CT ABD neg, syphilis neg.   Resolved leucopenia, improving bands, rash improving, fevers.   Clinically improved.   Skin bx consistent with drug eruption, viral exanthem is a possibility too.       Plan:   Continue with Tamiflu  Fever likely drug fever.   Monitor CBC.   Blood cx NTD.   Follow up mycoplasma IgM   If spikes again consider performing repeat blood cx.

## 2019-02-09 NOTE — PROGRESS NOTE ADULT - SUBJECTIVE AND OBJECTIVE BOX
63y old  Female who presents with a chief complaint of Rash (08 Feb 2019 16:12)      Interval history:  Febrile, rash improving, some neck pain, feels weak and tired. But improved than yesterday.     Allergies:   penicillin (Rash)      Antimicrobials:  oseltamivir 75 milliGRAM(s) Oral two times a day      REVIEW OF SYSTEMS:  No chest pain  No cough, no SOB  No N/V  No dysuria       Vital Signs Last 24 Hrs  T(C): 36.5 (02-09-19 @ 13:51), Max: 39.8 (02-08-19 @ 21:49)  T(F): 97.7 (02-09-19 @ 13:51), Max: 103.6 (02-08-19 @ 21:49)  HR: 88 (02-09-19 @ 13:51) (77 - 104)  BP: 103/56 (02-09-19 @ 13:51) (102/50 - 122/58)  RR: 18 (02-09-19 @ 13:51) (18 - 18)  SpO2: 98% (02-09-19 @ 13:51) (94% - 98%)    PHYSICAL EXAM:  Patient in no acute distress. Alert, awake.   + oral ulcers.  Cardiovascular: S1S2 normal.  Lungs: + air entry B/L lung fields.  Gastrointestinal: soft, nontender, nondistended.  Extremities: no edema.  IV sites not inflamed.   diffuse erythematous rash                           9.8    4.90  )-----------( 123      ( 09 Feb 2019 05:51 )             30.1   02-09    143  |  109<H>  |  6<L>  ----------------------------<  83  3.1<L>   |  22  |  0.56    Ca    7.3<L>      09 Feb 2019 05:51  Phos  2.7     02-09  Mg     1.6     02-09    TPro  5.6<L>  /  Alb  3.0<L>  /  TBili  < 0.2<L>  /  DBili  x   /  AST  30  /  ALT  28  /  AlkPhos  58  02-08      LIVER FUNCTIONS - ( 08 Feb 2019 11:50 )  Alb: 3.0 g/dL / Pro: 5.6 g/dL / ALK PHOS: 58 u/L / ALT: 28 u/L / AST: 30 u/L / GGT: x             EBV past infection, Immune to measles, Parvovirus negative, Mycoplasma IgG positive.       Culture - Blood (collected 07 Feb 2019 13:30)  Source: BLOOD VENOUS  Preliminary Report (09 Feb 2019 13:28):    NO ORGANISMS ISOLATED    NO ORGANISMS ISOLATED AT 48 HRS.    Culture - Blood (collected 07 Feb 2019 13:30)  Source: BLOOD PERIPHERAL  Preliminary Report (09 Feb 2019 13:28):    NO ORGANISMS ISOLATED    NO ORGANISMS ISOLATED AT 48 HRS.

## 2019-02-09 NOTE — PROVIDER CONTACT NOTE (OTHER) - SITUATION
Patient complains of headache and chills, requests rectal temperature assessment. Rectal temperature 100.6 .

## 2019-02-09 NOTE — PROGRESS NOTE ADULT - SUBJECTIVE AND OBJECTIVE BOX
Patient is a 63y old  Female who presents with a chief complaint of Rash (09 Feb 2019 16:34)      SUBJECTIVE / OVERNIGHT EVENTS:    Events noted.  No CP/SOB/Dizziness    MEDICATIONS  (STANDING):  oseltamivir 75 milliGRAM(s) Oral two times a day  sodium chloride 0.9%. 1000 milliLiter(s) (150 mL/Hr) IV Continuous <Continuous>    MEDICATIONS  (PRN):  acetaminophen   Tablet .. 650 milliGRAM(s) Oral every 6 hours PRN Temp greater or equal to 38C (100.4F), Mild Pain (1 - 3), Moderate Pain (4 - 6)  diphenhydrAMINE   Injectable 25 milliGRAM(s) IV Push every 6 hours PRN Itching        CAPILLARY BLOOD GLUCOSE        I&O's Summary    09 Feb 2019 07:01  -  09 Feb 2019 22:34  --------------------------------------------------------  IN: 0 mL / OUT: 500 mL / NET: -500 mL        PHYSICAL EXAM:  GENERAL: NAD  NECK: Supple, No JVD  CHEST/LUNG: Clear to auscultation bilaterally; No wheezing.  HEART: Regular rate and rhythm; No murmurs, rubs, or gallops  ABDOMEN: Soft, Nontender, Nondistended; Bowel sounds present  EXTREMITIES:   No clubbing, cyanosis, or edema  NEUROLOGY: AAO X 3      LABS:                        9.8    4.90  )-----------( 123      ( 09 Feb 2019 05:51 )             30.1     02-09    143  |  109<H>  |  6<L>  ----------------------------<  83  3.1<L>   |  22  |  0.56    Ca    7.3<L>      09 Feb 2019 05:51  Phos  2.7     02-09  Mg     1.6     02-09    TPro  5.6<L>  /  Alb  3.0<L>  /  TBili  < 0.2<L>  /  DBili  x   /  AST  30  /  ALT  28  /  AlkPhos  58  02-08            CAPILLARY BLOOD GLUCOSE        02-07 @ 13:30  Culture-urine --  Culture results --  method type --  Organism --  Organism Identification --  Specimen source BLOOD PERIPHERAL           02-07 @ 13:30  Culture blood   NO ORGANISMS ISOLATED  NO ORGANISMS ISOLATED AT 48 HRS.  Culture results --  Gram stain --  Gram stain blood --  Method type --  Organism --  Organism identification --  Specimen source BLOOD PERIPHERAL      RADIOLOGY & ADDITIONAL TESTS:    Imaging Personally Reviewed:    Consultant(s) Notes Reviewed:      Care Discussed with Consultants/Other Providers:

## 2019-02-10 ENCOUNTER — TRANSCRIPTION ENCOUNTER (OUTPATIENT)
Age: 64
End: 2019-02-10

## 2019-02-10 LAB
ANION GAP SERPL CALC-SCNC: 13 MMO/L — SIGNIFICANT CHANGE UP (ref 7–14)
BUN SERPL-MCNC: 4 MG/DL — LOW (ref 7–23)
CALCIUM SERPL-MCNC: 8 MG/DL — LOW (ref 8.4–10.5)
CHLORIDE SERPL-SCNC: 106 MMOL/L — SIGNIFICANT CHANGE UP (ref 98–107)
CO2 SERPL-SCNC: 23 MMOL/L — SIGNIFICANT CHANGE UP (ref 22–31)
CREAT SERPL-MCNC: 0.51 MG/DL — SIGNIFICANT CHANGE UP (ref 0.5–1.3)
GLUCOSE SERPL-MCNC: 83 MG/DL — SIGNIFICANT CHANGE UP (ref 70–99)
HCT VFR BLD CALC: 30.7 % — LOW (ref 34.5–45)
HGB BLD-MCNC: 9.9 G/DL — LOW (ref 11.5–15.5)
MAGNESIUM SERPL-MCNC: 1.8 MG/DL — SIGNIFICANT CHANGE UP (ref 1.6–2.6)
MCHC RBC-ENTMCNC: 25.4 PG — LOW (ref 27–34)
MCHC RBC-ENTMCNC: 32.2 % — SIGNIFICANT CHANGE UP (ref 32–36)
MCV RBC AUTO: 78.9 FL — LOW (ref 80–100)
NRBC # FLD: 0 K/UL — LOW (ref 25–125)
PHOSPHATE SERPL-MCNC: 2.3 MG/DL — LOW (ref 2.5–4.5)
PLATELET # BLD AUTO: 142 K/UL — LOW (ref 150–400)
PMV BLD: 10.9 FL — SIGNIFICANT CHANGE UP (ref 7–13)
POTASSIUM SERPL-MCNC: 3 MMOL/L — LOW (ref 3.5–5.3)
POTASSIUM SERPL-SCNC: 3 MMOL/L — LOW (ref 3.5–5.3)
RBC # BLD: 3.89 M/UL — SIGNIFICANT CHANGE UP (ref 3.8–5.2)
RBC # FLD: 13.5 % — SIGNIFICANT CHANGE UP (ref 10.3–14.5)
SODIUM SERPL-SCNC: 142 MMOL/L — SIGNIFICANT CHANGE UP (ref 135–145)
SPECIMEN SOURCE: SIGNIFICANT CHANGE UP
SPECIMEN SOURCE: SIGNIFICANT CHANGE UP
WBC # BLD: 6.14 K/UL — SIGNIFICANT CHANGE UP (ref 3.8–10.5)
WBC # FLD AUTO: 6.14 K/UL — SIGNIFICANT CHANGE UP (ref 3.8–10.5)

## 2019-02-10 RX ORDER — PANTOPRAZOLE SODIUM 20 MG/1
40 TABLET, DELAYED RELEASE ORAL
Qty: 0 | Refills: 0 | Status: DISCONTINUED | OUTPATIENT
Start: 2019-02-10 | End: 2019-02-13

## 2019-02-10 RX ORDER — SIMETHICONE 80 MG/1
80 TABLET, CHEWABLE ORAL EVERY 8 HOURS
Qty: 0 | Refills: 0 | Status: DISCONTINUED | OUTPATIENT
Start: 2019-02-10 | End: 2019-02-13

## 2019-02-10 RX ORDER — POTASSIUM CHLORIDE 20 MEQ
40 PACKET (EA) ORAL EVERY 4 HOURS
Qty: 0 | Refills: 0 | Status: COMPLETED | OUTPATIENT
Start: 2019-02-10 | End: 2019-02-10

## 2019-02-10 RX ORDER — POTASSIUM PHOSPHATE, MONOBASIC POTASSIUM PHOSPHATE, DIBASIC 236; 224 MG/ML; MG/ML
15 INJECTION, SOLUTION INTRAVENOUS ONCE
Qty: 0 | Refills: 0 | Status: COMPLETED | OUTPATIENT
Start: 2019-02-10 | End: 2019-02-10

## 2019-02-10 RX ORDER — ACETAMINOPHEN 500 MG
650 TABLET ORAL ONCE
Qty: 0 | Refills: 0 | Status: COMPLETED | OUTPATIENT
Start: 2019-02-10 | End: 2019-02-10

## 2019-02-10 RX ADMIN — SODIUM CHLORIDE 150 MILLILITER(S): 9 INJECTION INTRAMUSCULAR; INTRAVENOUS; SUBCUTANEOUS at 10:25

## 2019-02-10 RX ADMIN — Medication 650 MILLIGRAM(S): at 04:47

## 2019-02-10 RX ADMIN — Medication 40 MILLIEQUIVALENT(S): at 13:05

## 2019-02-10 RX ADMIN — Medication 75 MILLIGRAM(S): at 18:08

## 2019-02-10 RX ADMIN — Medication 650 MILLIGRAM(S): at 23:18

## 2019-02-10 RX ADMIN — PANTOPRAZOLE SODIUM 40 MILLIGRAM(S): 20 TABLET, DELAYED RELEASE ORAL at 18:08

## 2019-02-10 RX ADMIN — Medication 650 MILLIGRAM(S): at 23:56

## 2019-02-10 RX ADMIN — SODIUM CHLORIDE 150 MILLILITER(S): 9 INJECTION INTRAMUSCULAR; INTRAVENOUS; SUBCUTANEOUS at 03:47

## 2019-02-10 RX ADMIN — Medication 40 MILLIEQUIVALENT(S): at 18:08

## 2019-02-10 RX ADMIN — Medication 75 MILLIGRAM(S): at 06:08

## 2019-02-10 RX ADMIN — Medication 650 MILLIGRAM(S): at 03:47

## 2019-02-10 RX ADMIN — POTASSIUM PHOSPHATE, MONOBASIC POTASSIUM PHOSPHATE, DIBASIC 62.5 MILLIMOLE(S): 236; 224 INJECTION, SOLUTION INTRAVENOUS at 13:04

## 2019-02-10 RX ADMIN — Medication 650 MILLIGRAM(S): at 11:10

## 2019-02-10 RX ADMIN — Medication 650 MILLIGRAM(S): at 10:25

## 2019-02-10 NOTE — PROGRESS NOTE ADULT - ASSESSMENT
· Assessment	  64 yo F with no PMH p/w diffuse rash and high fevers of unclear etiology but likely 2/2 viral exanthem vs. drug eruption.     Problem/Plan - 1:  ·  Problem: Rash.  Plan:  Skin biopsy: Meds vs viral inf. Dermatology f/up noted.    Flu like symptoms:  Tamiflu  ID f/up     Dw family

## 2019-02-10 NOTE — CHART NOTE - NSCHARTNOTEFT_GEN_A_CORE
Notified by RN pt with headaches. Pt seen at bedside. As per pt., headaches are on/off on the frontal region extending to the back of the neck. Pt states that Tylenol helps. Denies any neck stiffness, photophobia, visual changes, nausea, vomiting, ear aches. Will apply compresses to area. Will continue to monitor.     ADS  [id number] Notified by RN pt with headaches. Pt seen at bedside. As per pt., headaches are on/off on the frontal region extending to the back of the neck. Pt states that Tylenol helps. Denies any neck stiffness, photophobia, visual changes, nausea, vomiting, ear aches. PE unremarkable.     Will apply compresses to area. Will continue to monitor.     ADS  [id number]

## 2019-02-10 NOTE — PROGRESS NOTE ADULT - SUBJECTIVE AND OBJECTIVE BOX
Patient is a 63y old  Female who presents with a chief complaint of Rash (10 Feb 2019 21:57)      SUBJECTIVE / OVERNIGHT EVENTS:    Events noted.  No CP/SOB    MEDICATIONS  (STANDING):  oseltamivir 75 milliGRAM(s) Oral two times a day  pantoprazole    Tablet 40 milliGRAM(s) Oral before breakfast    MEDICATIONS  (PRN):  acetaminophen   Tablet .. 650 milliGRAM(s) Oral every 6 hours PRN Temp greater or equal to 38C (100.4F), Mild Pain (1 - 3), Moderate Pain (4 - 6)  simethicone 80 milliGRAM(s) Chew every 8 hours PRN Gas        CAPILLARY BLOOD GLUCOSE        I&O's Summary    09 Feb 2019 07:01  -  10 Feb 2019 07:00  --------------------------------------------------------  IN: 0 mL / OUT: 500 mL / NET: -500 mL        PHYSICAL EXAM:  GENERAL: NAD  NECK: Supple, No JVD  CHEST/LUNG: Clear to auscultation bilaterally; No wheezing.  HEART: Regular rate and rhythm; No murmurs, rubs, or gallops  ABDOMEN: Soft, Nontender, Nondistended; Bowel sounds present  EXTREMITIES:   No clubbing, cyanosis, or edema  NEUROLOGY: AAO X 3      LABS:                        9.9    6.14  )-----------( 142      ( 10 Feb 2019 05:44 )             30.7     02-10    142  |  106  |  4<L>  ----------------------------<  83  3.0<L>   |  23  |  0.51    Ca    8.0<L>      10 Feb 2019 05:44  Phos  2.3     02-10  Mg     1.8     02-10              CAPILLARY BLOOD GLUCOSE        02-09 @ 23:09  Culture-urine --  Culture results --  method type --  Organism --  Organism Identification --  Specimen source BLOOD PERIPHERAL  02-07 @ 13:30  Culture-urine --  Culture results --  method type --  Organism --  Organism Identification --  Specimen source BLOOD PERIPHERAL           02-09 @ 23:09  Culture blood   NO ORGANISMS ISOLATED  NO ORGANISMS ISOLATED AT 24 HOURS  Culture results --  Gram stain --  Gram stain blood --  Method type --  Organism --  Organism identification --  Specimen source BLOOD PERIPHERAL   02-07 @ 13:30  Culture blood   NO ORGANISMS ISOLATED  NO ORGANISMS ISOLATED AT 72 HRS.  Culture results --  Gram stain --  Gram stain blood --  Method type --  Organism --  Organism identification --  Specimen source BLOOD PERIPHERAL      RADIOLOGY & ADDITIONAL TESTS:    Imaging Personally Reviewed:    Consultant(s) Notes Reviewed:      Care Discussed with Consultants/Other Providers:

## 2019-02-10 NOTE — PROGRESS NOTE ADULT - ASSESSMENT
# Dermatitis  Improving. Favor viral exanthem vs. morbiliform drug eruption   - prelim bx showing interface dermatitis, sparse perivascular lymphs and rare eos.   - will notify pt and family with final biopsy report   - discussed gentle skin care  - page dermatology on call resident ([phone]) for development of skin pain, facial swelling, lymphadenopathy, mucosal involvement, bullae, or skin sloughing as these are a sign of more serious drug eruption

## 2019-02-10 NOTE — PROGRESS NOTE ADULT - SUBJECTIVE AND OBJECTIVE BOX
INTERVAL HPI/OVERNIGHT EVENTS:  Rash improving since last seen. Febrile to T 100.6 yesterday PM, but otherwise feeling better. Parvovirus neg, measles immune, EBV serologies c/w prior infection.    MEDICATIONS  (STANDING):  oseltamivir 75 milliGRAM(s) Oral two times a day  pantoprazole    Tablet 40 milliGRAM(s) Oral before breakfast    MEDICATIONS  (PRN):  acetaminophen   Tablet .. 650 milliGRAM(s) Oral every 6 hours PRN Temp greater or equal to 38C (100.4F), Mild Pain (1 - 3), Moderate Pain (4 - 6)  simethicone 80 milliGRAM(s) Chew every 8 hours PRN Gas      Allergies    penicillin (Rash)    Intolerances        REVIEW OF SYSTEMS      General: no fevers/chills, no NS	    Skin: see HPI  	  Ophthalmologic: no eye pain or change in vision    Genitourinary: no dysuria or hematuria    Musculoskeletal: no joint pains or weakness	    Neurological:no weakness or tingling          Vital Signs Last 24 Hrs  T(C): 36.7 (10 Feb 2019 20:52), Max: 37.5 (10 Feb 2019 03:21)  T(F): 98.1 (10 Feb 2019 20:52), Max: 99.5 (10 Feb 2019 03:21)  HR: 96 (10 Feb 2019 20:52) (83 - 96)  BP: 144/89 (10 Feb 2019 20:52) (102/88 - 146/84)  BP(mean): --  RR: 18 (10 Feb 2019 20:52) (18 - 18)  SpO2: 100% (10 Feb 2019 20:52) (98% - 100%)    PHYSICAL EXAM:   The patient was alert and oriented X 3, well nourished, and in no  apparent distress.  There was no visible lymphadenopathy.  Conjunctiva were non injected  There was no clubbing or edema of extremities.  There was no hyperhidrosis or bromhidrosis.    Of note on skin exam:   - confluent, fading erythematous patches on trunk, distal upper extremities, and proximal lower extremities.     LABS:                        9.9    6.14  )-----------( 142      ( 10 Feb 2019 05:44 )             30.7     02-10    142  |  106  |  4<L>  ----------------------------<  83  3.0<L>   |  23  |  0.51    Ca    8.0<L>      10 Feb 2019 05:44  Phos  2.3     02-10  Mg     1.8     02-10            RADIOLOGY & ADDITIONAL TESTS:

## 2019-02-11 LAB
ANION GAP SERPL CALC-SCNC: 11 MMO/L — SIGNIFICANT CHANGE UP (ref 7–14)
APPEARANCE UR: CLEAR — SIGNIFICANT CHANGE UP
BASOPHILS # BLD AUTO: 0.07 K/UL — SIGNIFICANT CHANGE UP (ref 0–0.2)
BASOPHILS NFR BLD AUTO: 1.2 % — SIGNIFICANT CHANGE UP (ref 0–2)
BILIRUB UR-MCNC: NEGATIVE — SIGNIFICANT CHANGE UP
BLOOD UR QL VISUAL: NEGATIVE — SIGNIFICANT CHANGE UP
BUN SERPL-MCNC: 5 MG/DL — LOW (ref 7–23)
CALCIUM SERPL-MCNC: 9.2 MG/DL — SIGNIFICANT CHANGE UP (ref 8.4–10.5)
CHLORIDE SERPL-SCNC: 104 MMOL/L — SIGNIFICANT CHANGE UP (ref 98–107)
CO2 SERPL-SCNC: 26 MMOL/L — SIGNIFICANT CHANGE UP (ref 22–31)
COLOR SPEC: COLORLESS — SIGNIFICANT CHANGE UP
CREAT SERPL-MCNC: 0.52 MG/DL — SIGNIFICANT CHANGE UP (ref 0.5–1.3)
EOSINOPHIL # BLD AUTO: 0.44 K/UL — SIGNIFICANT CHANGE UP (ref 0–0.5)
EOSINOPHIL NFR BLD AUTO: 7.4 % — HIGH (ref 0–6)
GLUCOSE SERPL-MCNC: 97 MG/DL — SIGNIFICANT CHANGE UP (ref 70–99)
GLUCOSE UR-MCNC: NEGATIVE — SIGNIFICANT CHANGE UP
HCT VFR BLD CALC: 33.7 % — LOW (ref 34.5–45)
HGB BLD-MCNC: 11 G/DL — LOW (ref 11.5–15.5)
IMM GRANULOCYTES NFR BLD AUTO: 1 % — SIGNIFICANT CHANGE UP (ref 0–1.5)
KETONES UR-MCNC: NEGATIVE — SIGNIFICANT CHANGE UP
LEUKOCYTE ESTERASE UR-ACNC: NEGATIVE — SIGNIFICANT CHANGE UP
LYMPHOCYTES # BLD AUTO: 2.38 K/UL — SIGNIFICANT CHANGE UP (ref 1–3.3)
LYMPHOCYTES # BLD AUTO: 40.2 % — SIGNIFICANT CHANGE UP (ref 13–44)
MAGNESIUM SERPL-MCNC: 2 MG/DL — SIGNIFICANT CHANGE UP (ref 1.6–2.6)
MANUAL SMEAR VERIFICATION: SIGNIFICANT CHANGE UP
MCHC RBC-ENTMCNC: 25.5 PG — LOW (ref 27–34)
MCHC RBC-ENTMCNC: 32.6 % — SIGNIFICANT CHANGE UP (ref 32–36)
MCV RBC AUTO: 78 FL — LOW (ref 80–100)
MONOCYTES # BLD AUTO: 0.52 K/UL — SIGNIFICANT CHANGE UP (ref 0–0.9)
MONOCYTES NFR BLD AUTO: 8.8 % — SIGNIFICANT CHANGE UP (ref 2–14)
NEUTROPHILS # BLD AUTO: 2.45 K/UL — SIGNIFICANT CHANGE UP (ref 1.8–7.4)
NEUTROPHILS NFR BLD AUTO: 41.4 % — LOW (ref 43–77)
NITRITE UR-MCNC: NEGATIVE — SIGNIFICANT CHANGE UP
NRBC # FLD: 0 K/UL — LOW (ref 25–125)
PH UR: 7.5 — SIGNIFICANT CHANGE UP (ref 5–8)
PHOSPHATE SERPL-MCNC: 3.2 MG/DL — SIGNIFICANT CHANGE UP (ref 2.5–4.5)
PLATELET # BLD AUTO: 212 K/UL — SIGNIFICANT CHANGE UP (ref 150–400)
PMV BLD: 10.7 FL — SIGNIFICANT CHANGE UP (ref 7–13)
POTASSIUM SERPL-MCNC: 4.1 MMOL/L — SIGNIFICANT CHANGE UP (ref 3.5–5.3)
POTASSIUM SERPL-SCNC: 4.1 MMOL/L — SIGNIFICANT CHANGE UP (ref 3.5–5.3)
PROT UR-MCNC: NEGATIVE — SIGNIFICANT CHANGE UP
RBC # BLD: 4.32 M/UL — SIGNIFICANT CHANGE UP (ref 3.8–5.2)
RBC # FLD: 13.3 % — SIGNIFICANT CHANGE UP (ref 10.3–14.5)
SODIUM SERPL-SCNC: 141 MMOL/L — SIGNIFICANT CHANGE UP (ref 135–145)
SP GR SPEC: 1.01 — SIGNIFICANT CHANGE UP (ref 1–1.04)
UROBILINOGEN FLD QL: NORMAL — SIGNIFICANT CHANGE UP
WBC # BLD: 5.92 K/UL — SIGNIFICANT CHANGE UP (ref 3.8–10.5)
WBC # FLD AUTO: 5.92 K/UL — SIGNIFICANT CHANGE UP (ref 3.8–10.5)

## 2019-02-11 RX ORDER — ACETAMINOPHEN 500 MG
650 TABLET ORAL EVERY 6 HOURS
Qty: 0 | Refills: 0 | Status: DISCONTINUED | OUTPATIENT
Start: 2019-02-11 | End: 2019-02-13

## 2019-02-11 RX ORDER — SODIUM CHLORIDE 0.65 %
1 AEROSOL, SPRAY (ML) NASAL THREE TIMES A DAY
Qty: 0 | Refills: 0 | Status: DISCONTINUED | OUTPATIENT
Start: 2019-02-11 | End: 2019-02-13

## 2019-02-11 RX ORDER — IBUPROFEN 200 MG
400 TABLET ORAL EVERY 8 HOURS
Qty: 0 | Refills: 0 | Status: DISCONTINUED | OUTPATIENT
Start: 2019-02-11 | End: 2019-02-13

## 2019-02-11 RX ORDER — PSEUDOEPHEDRINE HCL 30 MG
30 TABLET ORAL THREE TIMES A DAY
Refills: 0 | Status: COMPLETED | OUTPATIENT
Start: 2019-02-11 | End: 2019-02-13

## 2019-02-11 RX ADMIN — Medication 650 MILLIGRAM(S): at 05:44

## 2019-02-11 RX ADMIN — Medication 30 MILLIGRAM(S): at 23:11

## 2019-02-11 RX ADMIN — Medication 75 MILLIGRAM(S): at 05:44

## 2019-02-11 RX ADMIN — Medication 650 MILLIGRAM(S): at 06:30

## 2019-02-11 RX ADMIN — PANTOPRAZOLE SODIUM 40 MILLIGRAM(S): 20 TABLET, DELAYED RELEASE ORAL at 05:44

## 2019-02-11 NOTE — PROGRESS NOTE ADULT - ASSESSMENT
· Assessment	  62 yo F with no PMH p/w diffuse rash and high fevers of unclear etiology but likely 2/2 viral exanthem vs. drug eruption.     Problem/Plan - 1:  ·  Problem: Rash.  Plan:  Skin biopsy: Meds vs viral inf.     Flu like symptoms:  Headache:    Tamiflu    Due to nasal congestion  Pseudophed  Saline nasal spray

## 2019-02-11 NOTE — PROGRESS NOTE ADULT - SUBJECTIVE AND OBJECTIVE BOX
Patient is a 63y old  Female who presents with a chief complaint of Rash (10 Feb 2019 21:57)      SUBJECTIVE / OVERNIGHT EVENTS:    Events noted.  C/o Headache  RESPIRATORY: No cough, wheezing, chills or hemoptysis; No shortness of breath  CARDIOVASCULAR: No chest pain, palpitations, dizziness, or leg swelling  GASTROINTESTINAL: No abdominal or epigastric pain. No nausea, vomiting, or hematemesis; No diarrhea or constipation.       MEDICATIONS  (STANDING):  oseltamivir 75 milliGRAM(s) Oral two times a day  pantoprazole    Tablet 40 milliGRAM(s) Oral before breakfast  pseudoephedrine 30 milliGRAM(s) Oral three times a day    MEDICATIONS  (PRN):  acetaminophen   Tablet .. 650 milliGRAM(s) Oral every 6 hours PRN Temp greater or equal to 38C (100.4F), Mild Pain (1 - 3)  ibuprofen  Tablet. 400 milliGRAM(s) Oral every 8 hours PRN Moderate Pain (4 - 6), Severe Pain (7 - 10)  simethicone 80 milliGRAM(s) Chew every 8 hours PRN Gas  sodium chloride 0.65% Nasal 1 Spray(s) Both Nostrils three times a day PRN Nasal Congestion        CAPILLARY BLOOD GLUCOSE        I&O's Summary      PHYSICAL EXAM:  GENERAL: NAD  NECK: Supple, No JVD  CHEST/LUNG: Clear to auscultation bilaterally; No wheezing.  HEART: Regular rate and rhythm; No murmurs, rubs, or gallops  ABDOMEN: Soft, Nontender, Nondistended; Bowel sounds present  EXTREMITIES:   No clubbing, cyanosis, or edema  NEUROLOGY: AAO X 3      LABS:                        11.0   5.92  )-----------( 212      ( 2019 06:32 )             33.7         141  |  104  |  5<L>  ----------------------------<  97  4.1   |  26  |  0.52    Ca    9.2      2019 04:00  Phos  3.2       Mg     2.0                 Urinalysis Basic - ( 2019 21:15 )    Color: COLORLESS / Appearance: CLEAR / S.011 / pH: 7.5  Gluc: NEGATIVE / Ketone: NEGATIVE  / Bili: NEGATIVE / Urobili: NORMAL   Blood: NEGATIVE / Protein: NEGATIVE / Nitrite: NEGATIVE   Leuk Esterase: NEGATIVE / RBC: x / WBC x   Sq Epi: x / Non Sq Epi: x / Bacteria: x      CAPILLARY BLOOD GLUCOSE         @ 23:09  Culture-urine --  Culture results --  method type --  Organism --  Organism Identification --  Specimen source BLOOD PERIPHERAL   @ 13:30  Culture-urine --  Culture results --  method type --  Organism --  Organism Identification --  Specimen source BLOOD PERIPHERAL            @ 23:09  Culture blood   NO ORGANISMS ISOLATED  NO ORGANISMS ISOLATED AT 48 HRS.  Culture results --  Gram stain --  Gram stain blood --  Method type --  Organism --  Organism identification --  Specimen source BLOOD PERIPHERAL    @ 13:30  Culture blood   NO ORGANISMS ISOLATED  NO ORGANISMS ISOLATED AT 96 HOURS  Culture results --  Gram stain --  Gram stain blood --  Method type --  Organism --  Organism identification --  Specimen source BLOOD PERIPHERAL      RADIOLOGY & ADDITIONAL TESTS:    Imaging Personally Reviewed:    Consultant(s) Notes Reviewed:      Care Discussed with Consultants/Other Providers:

## 2019-02-12 ENCOUNTER — RESULT REVIEW (OUTPATIENT)
Age: 64
End: 2019-02-12

## 2019-02-12 LAB
ANION GAP SERPL CALC-SCNC: 13 MMO/L — SIGNIFICANT CHANGE UP (ref 7–14)
B19V IGG SER QL: NEGATIVE — SIGNIFICANT CHANGE UP
B19V IGG SER-ACNC: 0.2 INDEX — SIGNIFICANT CHANGE UP (ref 0–0.8)
B19V IGM FLD-ACNC: 0.2 INDEX — SIGNIFICANT CHANGE UP (ref 0–0.8)
B19V IGM SER-ACNC: NEGATIVE — SIGNIFICANT CHANGE UP
BACTERIA BLD CULT: SIGNIFICANT CHANGE UP
BACTERIA BLD CULT: SIGNIFICANT CHANGE UP
BASOPHILS # BLD AUTO: 0.07 K/UL — SIGNIFICANT CHANGE UP (ref 0–0.2)
BASOPHILS NFR BLD AUTO: 0.9 % — SIGNIFICANT CHANGE UP (ref 0–2)
BUN SERPL-MCNC: 13 MG/DL — SIGNIFICANT CHANGE UP (ref 7–23)
CALCIUM SERPL-MCNC: 9.6 MG/DL — SIGNIFICANT CHANGE UP (ref 8.4–10.5)
CHLORIDE SERPL-SCNC: 98 MMOL/L — SIGNIFICANT CHANGE UP (ref 98–107)
CO2 SERPL-SCNC: 29 MMOL/L — SIGNIFICANT CHANGE UP (ref 22–31)
CREAT SERPL-MCNC: 0.64 MG/DL — SIGNIFICANT CHANGE UP (ref 0.5–1.3)
EOSINOPHIL # BLD AUTO: 0.45 K/UL — SIGNIFICANT CHANGE UP (ref 0–0.5)
EOSINOPHIL NFR BLD AUTO: 6 % — SIGNIFICANT CHANGE UP (ref 0–6)
GLUCOSE SERPL-MCNC: 97 MG/DL — SIGNIFICANT CHANGE UP (ref 70–99)
HCT VFR BLD CALC: 37.6 % — SIGNIFICANT CHANGE UP (ref 34.5–45)
HGB BLD-MCNC: 12 G/DL — SIGNIFICANT CHANGE UP (ref 11.5–15.5)
IMM GRANULOCYTES NFR BLD AUTO: 1.1 % — SIGNIFICANT CHANGE UP (ref 0–1.5)
LYMPHOCYTES # BLD AUTO: 2.68 K/UL — SIGNIFICANT CHANGE UP (ref 1–3.3)
LYMPHOCYTES # BLD AUTO: 36 % — SIGNIFICANT CHANGE UP (ref 13–44)
M PNEUMO IGM SER-ACNC: <20 — SIGNIFICANT CHANGE UP
MAGNESIUM SERPL-MCNC: 2.1 MG/DL — SIGNIFICANT CHANGE UP (ref 1.6–2.6)
MCHC RBC-ENTMCNC: 25.2 PG — LOW (ref 27–34)
MCHC RBC-ENTMCNC: 31.9 % — LOW (ref 32–36)
MCV RBC AUTO: 78.8 FL — LOW (ref 80–100)
MONOCYTES # BLD AUTO: 0.72 K/UL — SIGNIFICANT CHANGE UP (ref 0–0.9)
MONOCYTES NFR BLD AUTO: 9.7 % — SIGNIFICANT CHANGE UP (ref 2–14)
MYCOPLASMA AG SPEC QL: NEGATIVE — SIGNIFICANT CHANGE UP
NEUTROPHILS # BLD AUTO: 3.44 K/UL — SIGNIFICANT CHANGE UP (ref 1.8–7.4)
NEUTROPHILS NFR BLD AUTO: 46.3 % — SIGNIFICANT CHANGE UP (ref 43–77)
NRBC # FLD: 0 K/UL — LOW (ref 25–125)
PHOSPHATE SERPL-MCNC: 4.3 MG/DL — SIGNIFICANT CHANGE UP (ref 2.5–4.5)
PLATELET # BLD AUTO: 292 K/UL — SIGNIFICANT CHANGE UP (ref 150–400)
PMV BLD: 10.4 FL — SIGNIFICANT CHANGE UP (ref 7–13)
POTASSIUM SERPL-MCNC: 4.1 MMOL/L — SIGNIFICANT CHANGE UP (ref 3.5–5.3)
POTASSIUM SERPL-SCNC: 4.1 MMOL/L — SIGNIFICANT CHANGE UP (ref 3.5–5.3)
RBC # BLD: 4.77 M/UL — SIGNIFICANT CHANGE UP (ref 3.8–5.2)
RBC # FLD: 13.4 % — SIGNIFICANT CHANGE UP (ref 10.3–14.5)
SODIUM SERPL-SCNC: 140 MMOL/L — SIGNIFICANT CHANGE UP (ref 135–145)
WBC # BLD: 7.44 K/UL — SIGNIFICANT CHANGE UP (ref 3.8–10.5)
WBC # FLD AUTO: 7.44 K/UL — SIGNIFICANT CHANGE UP (ref 3.8–10.5)

## 2019-02-12 PROCEDURE — 99232 SBSQ HOSP IP/OBS MODERATE 35: CPT

## 2019-02-12 RX ADMIN — Medication 30 MILLIGRAM(S): at 05:59

## 2019-02-12 RX ADMIN — Medication 75 MILLIGRAM(S): at 06:00

## 2019-02-12 RX ADMIN — PANTOPRAZOLE SODIUM 40 MILLIGRAM(S): 20 TABLET, DELAYED RELEASE ORAL at 06:00

## 2019-02-12 RX ADMIN — Medication 30 MILLIGRAM(S): at 14:22

## 2019-02-12 RX ADMIN — Medication 1 SPRAY(S): at 19:04

## 2019-02-12 RX ADMIN — Medication 650 MILLIGRAM(S): at 23:58

## 2019-02-12 NOTE — PROGRESS NOTE ADULT - ASSESSMENT
63 year old F w/ no significant PMH w/ flu like symptoms + maculopapular rash for 3 days. Pt was febrile in ED to 100.8 and has remained afebrile since. Pt is leukopenic w/ elevated bands. RVP neg, CXR neg, CT ABD neg, syphilis neg.   Resolved leucopenia, improving bands, rash improving, fevers.   Clinically improved.   Skin bx consistent with drug eruption, viral exanthem is a possibility too.       1) Leukopenia: improved    2) Rash: ? viral exanthem vs drug reaction per dermatology    3) fever: viral illness/ drug reaction  Repeat blood culture if fever recurrs    Observe off antimicrobials.

## 2019-02-12 NOTE — PROGRESS NOTE ADULT - ASSESSMENT
· Assessment	  64 yo F with no PMH p/w diffuse rash and high fevers of unclear etiology but likely 2/2 viral exanthem vs. drug eruption.     Problem/Plan - 1:  ·  Problem: Rash/Viral syndrome:  Improved.  Dw family  Dc home tomorrow.

## 2019-02-12 NOTE — PROGRESS NOTE ADULT - ATTENDING COMMENTS
Michelle Reynolds  Pager: [phone]. If no response or past 5 pm call [phone].
Norbert Leyva  Attending Physician   Division of Infectious Disease  Pager #[phone]  After 5pm/weekend or no response, call #[phone]    Please call the ID service [phone] with questions or concerns over the weekend.
Michelle Reynolds  Pager: [phone]. If no response or past 5 pm call [phone].

## 2019-02-12 NOTE — PROGRESS NOTE ADULT - SUBJECTIVE AND OBJECTIVE BOX
Patient is a 63y old  Female who presents with a chief complaint of Rash (2019 16:11)      SUBJECTIVE / OVERNIGHT EVENTS:    Events noted.  Feels better.  CONSTITUTIONAL: No fever,  or fatigue  NECK: No pain or stiffness  RESPIRATORY: No cough, wheezing, chills or hemoptysis; No shortness of breath  CARDIOVASCULAR: No chest pain, palpitations, dizziness, or leg swelling  GASTROINTESTINAL: No abdominal or epigastric pain. No nausea, vomiting, or hematemesis; No diarrhea or constipation.   NEUROLOGICAL: No headaches,     MEDICATIONS  (STANDING):  pantoprazole    Tablet 40 milliGRAM(s) Oral before breakfast  pseudoephedrine 30 milliGRAM(s) Oral three times a day  triamcinolone 0.1% Ointment 1 Application(s) Topical every 12 hours    MEDICATIONS  (PRN):  acetaminophen   Tablet .. 650 milliGRAM(s) Oral every 6 hours PRN Temp greater or equal to 38C (100.4F), Mild Pain (1 - 3)  ibuprofen  Tablet. 400 milliGRAM(s) Oral every 8 hours PRN Moderate Pain (4 - 6), Severe Pain (7 - 10)  simethicone 80 milliGRAM(s) Chew every 8 hours PRN Gas  sodium chloride 0.65% Nasal 1 Spray(s) Both Nostrils three times a day PRN Nasal Congestion        CAPILLARY BLOOD GLUCOSE        I&O's Summary      PHYSICAL EXAM:  GENERAL: NAD  NECK: Supple, No JVD  CHEST/LUNG: Clear to auscultation bilaterally; No wheezing.  HEART: Regular rate and rhythm; No murmurs, rubs, or gallops  ABDOMEN: Soft, Nontender, Nondistended; Bowel sounds present  EXTREMITIES:   No clubbing, cyanosis, or edema  NEUROLOGY: AAO X 3      LABS:                        12.0   7.44  )-----------( 292      ( 2019 06:20 )             37.6     02-12    140  |  98  |  13  ----------------------------<  97  4.1   |  29  |  0.64    Ca    9.6      2019 06:20  Phos  4.3     02-12  Mg     2.1     02-12            Urinalysis Basic - ( 2019 21:15 )    Color: COLORLESS / Appearance: CLEAR / S.011 / pH: 7.5  Gluc: NEGATIVE / Ketone: NEGATIVE  / Bili: NEGATIVE / Urobili: NORMAL   Blood: NEGATIVE / Protein: NEGATIVE / Nitrite: NEGATIVE   Leuk Esterase: NEGATIVE / RBC: x / WBC x   Sq Epi: x / Non Sq Epi: x / Bacteria: x      CAPILLARY BLOOD GLUCOSE         @ 23:09  Culture-urine --  Culture results --  method type --  Organism --  Organism Identification --  Specimen source BLOOD PERIPHERAL   @ 13:30  Culture-urine --  Culture results --  method type --  Organism --  Organism Identification --  Specimen source BLOOD PERIPHERAL            @ 23:09  Culture blood   NO ORGANISMS ISOLATED  NO ORGANISMS ISOLATED AT 48 HRS.  Culture results --  Gram stain --  Gram stain blood --  Method type --  Organism --  Organism identification --  Specimen source BLOOD PERIPHERAL    @ 13:30  Culture blood   NO ORGANISMS ISOLATED  Culture results --  Gram stain --  Gram stain blood --  Method type --  Organism --  Organism identification --  Specimen source BLOOD PERIPHERAL      RADIOLOGY & ADDITIONAL TESTS:    Imaging Personally Reviewed:    Consultant(s) Notes Reviewed:      Care Discussed with Consultants/Other Providers:

## 2019-02-12 NOTE — PROGRESS NOTE ADULT - SUBJECTIVE AND OBJECTIVE BOX
Follow Up:      Inverval History/ROS:Patient is a 63y old  Female who presents with a chief complaint of Rash (2019 14:49)  Afebrile  Rash is better      Allergies    penicillin (Rash)    Intolerances        ANTIMICROBIALS:      OTHER MEDS:  acetaminophen   Tablet .. 650 milliGRAM(s) Oral every 6 hours PRN  ibuprofen  Tablet. 400 milliGRAM(s) Oral every 8 hours PRN  pantoprazole    Tablet 40 milliGRAM(s) Oral before breakfast  pseudoephedrine 30 milliGRAM(s) Oral three times a day  simethicone 80 milliGRAM(s) Chew every 8 hours PRN  sodium chloride 0.65% Nasal 1 Spray(s) Both Nostrils three times a day PRN      Vital Signs Last 24 Hrs  T(C): 36.4 (2019 13:13), Max: 36.8 (2019 05:55)  T(F): 97.5 (2019 13:13), Max: 98.3 (2019 05:55)  HR: 90 (2019 14:19) (89 - 105)  BP: 140/70 (2019 14:19) (140/70 - 151/89)  BP(mean): --  RR: 18 (2019 13:13) (17 - 18)  SpO2: 100% (2019 13:13) (95% - 100%)    PHYSICAL EXAM:  General: [x ] non-toxic  HEAD/EYES: [ ] PERRL [x ] white sclera [ ] icterus  ENT:  [ ] normal [x ] supple [ ] thrush [ ] pharyngeal exudate  Cardiovascular:   [ ] murmur [ x] normal [ ] PPM/AICD  Respiratory:  [ x] clear to ausculation bilaterally  GI:  [x ] soft, non-tender, normal bowel sounds  :  [ ] morales [x ] no CVA tenderness   Musculoskeletal:  [x ] no synovitis  Neurologic:  [x ] non-focal exam   Skin:  [ ] no rash  Lymph: [ ] no lymphadenopathy  Psychiatric:  [ x] appropriate affect [ ] alert & oriented  Lines:  [ x] no phlebitis [ ] central line                                12.0   7.44  )-----------( 292      ( 2019 06:20 )             37.6       02-12    140  |  98  |  13  ----------------------------<  97  4.1   |  29  |  0.64    Ca    9.6      2019 06:20  Phos  4.3     02-12  Mg     2.1     02-12        Urinalysis Basic - ( 2019 21:15 )    Color: COLORLESS / Appearance: CLEAR / S.011 / pH: 7.5  Gluc: NEGATIVE / Ketone: NEGATIVE  / Bili: NEGATIVE / Urobili: NORMAL   Blood: NEGATIVE / Protein: NEGATIVE / Nitrite: NEGATIVE   Leuk Esterase: NEGATIVE / RBC: x / WBC x   Sq Epi: x / Non Sq Epi: x / Bacteria: x        MICROBIOLOGY:    RADIOLOGY:

## 2019-02-13 ENCOUNTER — TRANSCRIPTION ENCOUNTER (OUTPATIENT)
Age: 64
End: 2019-02-13

## 2019-02-13 VITALS
SYSTOLIC BLOOD PRESSURE: 145 MMHG | HEART RATE: 95 BPM | TEMPERATURE: 98 F | RESPIRATION RATE: 18 BRPM | DIASTOLIC BLOOD PRESSURE: 85 MMHG | OXYGEN SATURATION: 98 %

## 2019-02-13 RX ORDER — PANTOPRAZOLE SODIUM 20 MG/1
1 TABLET, DELAYED RELEASE ORAL
Qty: 30 | Refills: 0 | OUTPATIENT
Start: 2019-02-13 | End: 2019-03-14

## 2019-02-13 RX ORDER — SIMETHICONE 80 MG/1
1 TABLET, CHEWABLE ORAL
Qty: 0 | Refills: 0 | COMMUNITY
Start: 2019-02-13

## 2019-02-13 RX ORDER — SODIUM CHLORIDE 0.65 %
0 AEROSOL, SPRAY (ML) NASAL
Qty: 0 | Refills: 0 | COMMUNITY
Start: 2019-02-13

## 2019-02-13 RX ORDER — IBUPROFEN 200 MG
1 TABLET ORAL
Qty: 0 | Refills: 0 | COMMUNITY
Start: 2019-02-13

## 2019-02-13 RX ADMIN — Medication 1 APPLICATION(S): at 17:48

## 2019-02-13 RX ADMIN — Medication 1 APPLICATION(S): at 06:51

## 2019-02-13 RX ADMIN — Medication 650 MILLIGRAM(S): at 01:45

## 2019-02-13 RX ADMIN — Medication 400 MILLIGRAM(S): at 17:47

## 2019-02-13 RX ADMIN — Medication 30 MILLIGRAM(S): at 13:24

## 2019-02-13 RX ADMIN — Medication 400 MILLIGRAM(S): at 07:40

## 2019-02-13 RX ADMIN — Medication 400 MILLIGRAM(S): at 06:52

## 2019-02-13 RX ADMIN — Medication 30 MILLIGRAM(S): at 00:00

## 2019-02-13 RX ADMIN — Medication 1 APPLICATION(S): at 00:02

## 2019-02-13 RX ADMIN — PANTOPRAZOLE SODIUM 40 MILLIGRAM(S): 20 TABLET, DELAYED RELEASE ORAL at 06:51

## 2019-02-13 RX ADMIN — Medication 400 MILLIGRAM(S): at 18:30

## 2019-02-13 RX ADMIN — Medication 30 MILLIGRAM(S): at 06:51

## 2019-02-13 NOTE — DISCHARGE NOTE ADULT - PLAN OF CARE
Treatment You presented to the emergency department with a rash. Dermatology was consulted. -Punch biopsy was performed which showed: No full-thickness epidermal necrosis is present.  As per Dermatology stable for discharge, use triamcinolone for itching, outpatient follow up with dermatology. You were diagnosed with the flu. You completed a 5 day course of antibiotics in the hospital. Continue your medications as directed and please follow-up as an outpatient with your primary care provider for further care and recommendations. You reported having a headache during your hospital stay. your headache is likely due to a viral infection. You reported improvement in symptoms with sudafed and nasal spray. Continue your medications as directed and please follow-up as an outpatient with your primary care provider for further care and recommendations.

## 2019-02-13 NOTE — DISCHARGE NOTE ADULT - CARE PLAN
Principal Discharge DX:	Rash  Goal:	Treatment  Assessment and plan of treatment:	You presented to the emergency department with a rash. Dermatology was consulted. -Punch biopsy was performed which showed: No full-thickness epidermal necrosis is present.  As per Dermatology stable for discharge, use triamcinolone for itching, outpatient follow up with dermatology.  Secondary Diagnosis:	Viral syndrome  Assessment and plan of treatment:	You were diagnosed with the flu. You completed a 5 day course of antibiotics in the hospital. Continue your medications as directed and please follow-up as an outpatient with your primary care provider for further care and recommendations.  Secondary Diagnosis:	Headache  Assessment and plan of treatment:	You reported having a headache during your hospital stay. your headache is likely due to a viral infection. You reported improvement in symptoms with sudafed and nasal spray. Continue your medications as directed and please follow-up as an outpatient with your primary care provider for further care and recommendations.

## 2019-02-13 NOTE — DISCHARGE NOTE ADULT - CARE PROVIDER_API CALL
Sanjuana Rivas)  Dermatology  [address], [address]  [place] [address]  Phone: [phone]  Fax: [phone]  Follow Up Time:

## 2019-02-13 NOTE — DISCHARGE NOTE ADULT - HOSPITAL COURSE
63 F with no PMH presented to the ED with fevers, arthralgias, red rash all over chest, abdomen, arms and legs with spread to palms. Endorses couple of lesions on the side of her tongue. Endorses mild cough, runny nose and mild, diffuse abdominal pain over this period. Pt was seen ar urgent care and diagnosed with influenza. S/P Solumedrol. In ED, Tmax 100.8, . Labs with bandemia 8%, repeat 44%.     Rash  -Morbilliform drug eruption vs viral exanthem.    -Derm Cx   -S/P biopsy, doesn't suggest SJS/TENS   -RVP, CXR, US abdomen negative for acute cholecystitis  -Syphilis negative, Parvovirus negative   -Measles IgG positive, IgM negative   -Tylenol, Benadryl PRN   - Mycoplasma IgG POS, F/U Mycoplasma IgM < 20   - Punch biopsy: No full-thickness epidermal necrosis is present. The findings  are consistent with a drug eruption. A viral exanthem cannot be entirely excluded.  - As per Dermatology stable for discharge, triamcinolone for itching, outpatient follow up with dermatology     Flu  -ID Cx   -Tamiflu x 5 days     Headache:  headache likely secondary to viral infection  - Improvement in symptoms with sudafed and nasal spray  - Follow up with PCP as outpatient     As per medical attending patient is medically stable for discharge to home   Dispo: home

## 2019-02-13 NOTE — DISCHARGE NOTE ADULT - ADDITIONAL INSTRUCTIONS
Follow up with your Dermatology within 2 weeks of discharge from the hospital    Continue your medications as directed and please follow-up as an outpatient with your primary care provider for further care and recommendations.

## 2019-02-13 NOTE — DISCHARGE NOTE ADULT - PATIENT PORTAL LINK FT
You can access the [facility] Patient Portal, offered by [facility], by registering with the following website: [email or web address]

## 2019-02-13 NOTE — DISCHARGE NOTE ADULT - MEDICATION SUMMARY - MEDICATIONS TO TAKE
I will START or STAY ON the medications listed below when I get home from the hospital:    ibuprofen 400 mg oral tablet  -- 1 tab(s) by mouth every 8 hours, As needed, Moderate Pain (4 - 6), Severe Pain (7 - 10)  -- Indication: For Prn for pain/headache    triamcinolone 0.1% topical ointment  -- 1 application on skin every 12 hours  -- Indication: For Rash/itching     simethicone 80 mg oral tablet, chewable  -- 1 tab(s) by mouth every 8 hours, As needed, Gas  -- Indication: For Prn bloating    sodium chloride 0.65% nasal spray  --  into nose spray three times a day PRN for into nose congestion   -- Indication: For Nasal congestion    pantoprazole 40 mg oral delayed release tablet  -- 1 tab(s) by mouth once a day (before a meal)  -- Indication: For GERD I will START or STAY ON the medications listed below when I get home from the hospital:    ibuprofen 400 mg oral tablet  -- 1 tab(s) by mouth every 8 hours, As needed, Moderate Pain (4 - 6), Severe Pain (7 - 10)  -- Indication: For Prn pain    triamcinolone 0.1% topical ointment  -- 1 application on skin every 12 hours  -- Indication: For Rash/itching    simethicone 80 mg oral tablet, chewable  -- 1 tab(s) by mouth every 8 hours, As needed, Gas  -- Indication: For Prn bloating    sodium chloride 0.65% nasal spray  --  into nose spray three times a day PRN for into nose congestion   -- Indication: For Nasal decongestant    pantoprazole 40 mg oral delayed release tablet  -- 1 tab(s) by mouth once a day (before a meal)  -- Indication: For GERD

## 2019-02-14 LAB
BACTERIA BLD CULT: SIGNIFICANT CHANGE UP
BACTERIA BLD CULT: SIGNIFICANT CHANGE UP

## 2019-02-21 ENCOUNTER — APPOINTMENT (OUTPATIENT)
Dept: DERMATOLOGY | Facility: CLINIC | Age: 64
End: 2019-02-21
Payer: COMMERCIAL

## 2019-02-21 VITALS
WEIGHT: 124 LBS | HEIGHT: 62 IN | BODY MASS INDEX: 22.82 KG/M2 | SYSTOLIC BLOOD PRESSURE: 132 MMHG | DIASTOLIC BLOOD PRESSURE: 80 MMHG

## 2019-02-21 DIAGNOSIS — L85.3 XEROSIS CUTIS: ICD-10-CM

## 2019-02-21 DIAGNOSIS — B09 UNSPECIFIED VIRAL INFECTION CHARACTERIZED BY SKIN AND MUCOUS MEMBRANE LESIONS: ICD-10-CM

## 2019-02-21 PROCEDURE — 99213 OFFICE O/P EST LOW 20 MIN: CPT

## 2019-02-21 NOTE — HISTORY OF PRESENT ILLNESS
[FreeTextEntry1] : Followup Hospital  [de-identified] : 63 year old female is here for followup from hospital from dermatitis with ddx including viral exanthem She has greatly improved but had a few episodes of pruritus, no rash.\par She has taken oral benadryl tablets at night and also use triamcinolone 0.1% ointment sparingly to pruritic areas.\par \par

## 2019-02-21 NOTE — PHYSICAL EXAM
[FreeTextEntry3] : A&OX3, WDWN, Pleasant \par \par The following body parts were examined:\par Face, Neck, Chest, Abdomen, Back, Both arms\par \par Pt was not fully undressed\par \par See assessment for physical description of findings\par

## 2019-02-21 NOTE — ASSESSMENT
[FreeTextEntry1] : 1. Viral exanthem - resolved\par \par 2. Xerosis cutis - dove sensitive skin soap and vaseline 100% pure\par will give refill for triamcinolone 0.1% ointment to use twice daily if needed for two weeks to pruritic areas\par Risk of overuse of topical steroids discussed. Overuse can lead to permanent marks and skin thinning\par

## 2020-04-26 ENCOUNTER — MESSAGE (OUTPATIENT)
Age: 65
End: 2020-04-26

## 2020-06-25 ENCOUNTER — TRANSCRIPTION ENCOUNTER (OUTPATIENT)
Age: 65
End: 2020-06-25

## 2021-01-22 ENCOUNTER — APPOINTMENT (OUTPATIENT)
Dept: FAMILY MEDICINE | Facility: CLINIC | Age: 66
End: 2021-01-22
Payer: MEDICARE

## 2021-01-22 ENCOUNTER — NON-APPOINTMENT (OUTPATIENT)
Age: 66
End: 2021-01-22

## 2021-01-22 VITALS
OXYGEN SATURATION: 100 % | TEMPERATURE: 97.8 F | WEIGHT: 116 LBS | HEART RATE: 74 BPM | HEIGHT: 62 IN | BODY MASS INDEX: 21.35 KG/M2 | DIASTOLIC BLOOD PRESSURE: 79 MMHG | SYSTOLIC BLOOD PRESSURE: 132 MMHG

## 2021-01-22 DIAGNOSIS — Z82.49 FAMILY HISTORY OF ISCHEMIC HEART DISEASE AND OTHER DISEASES OF THE CIRCULATORY SYSTEM: ICD-10-CM

## 2021-01-22 DIAGNOSIS — Z78.9 OTHER SPECIFIED HEALTH STATUS: ICD-10-CM

## 2021-01-22 DIAGNOSIS — Z11.1 ENCOUNTER FOR SCREENING FOR RESPIRATORY TUBERCULOSIS: ICD-10-CM

## 2021-01-22 PROCEDURE — 93000 ELECTROCARDIOGRAM COMPLETE: CPT | Mod: 59

## 2021-01-22 PROCEDURE — 36415 COLL VENOUS BLD VENIPUNCTURE: CPT

## 2021-01-22 PROCEDURE — G0439: CPT

## 2021-01-22 PROCEDURE — 99072 ADDL SUPL MATRL&STAF TM PHE: CPT

## 2021-01-22 RX ORDER — TRIAMCINOLONE ACETONIDE 1 MG/G
0.1 OINTMENT TOPICAL
Qty: 1 | Refills: 0 | Status: COMPLETED | COMMUNITY
Start: 2019-02-21 | End: 2021-01-22

## 2021-01-22 NOTE — HISTORY OF PRESENT ILLNESS
[FreeTextEntry1] : annual [de-identified] : 64 yo F with no significant PMH presents for annual/establish care. She has been well. She is also due for HALKAR for work. 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

## 2021-01-22 NOTE — HEALTH RISK ASSESSMENT
[No] : In the past 12 months have you used drugs other than those required for medical reasons? No [Patient reported mammogram was normal] : Patient reported mammogram was normal [Patient reported PAP Smear was normal] : Patient reported PAP Smear was normal [Patient reported colonoscopy was normal] : Patient reported colonoscopy was normal [] : No [[id number]] : 0 [PapSmearDate] : 2019 [MammogramDate] : 2020 [ColonoscopyDate] : 2020

## 2021-01-25 LAB
25(OH)D3 SERPL-MCNC: 33.6 NG/ML
ALBUMIN SERPL ELPH-MCNC: 4.8 G/DL
ALP BLD-CCNC: 76 U/L
ALT SERPL-CCNC: 14 U/L
ANION GAP SERPL CALC-SCNC: 14 MMOL/L
AST SERPL-CCNC: 19 U/L
BASOPHILS # BLD AUTO: 0.03 K/UL
BASOPHILS NFR BLD AUTO: 0.5 %
BILIRUB SERPL-MCNC: 0.2 MG/DL
BUN SERPL-MCNC: 18 MG/DL
CALCIUM SERPL-MCNC: 9.9 MG/DL
CHLORIDE SERPL-SCNC: 101 MMOL/L
CHOLEST SERPL-MCNC: 210 MG/DL
CO2 SERPL-SCNC: 26 MMOL/L
CREAT SERPL-MCNC: 0.71 MG/DL
EOSINOPHIL # BLD AUTO: 0.04 K/UL
EOSINOPHIL NFR BLD AUTO: 0.6 %
ESTIMATED AVERAGE GLUCOSE: 108 MG/DL
GLUCOSE SERPL-MCNC: 83 MG/DL
HBA1C MFR BLD HPLC: 5.4 %
HCT VFR BLD CALC: 42.2 %
HDLC SERPL-MCNC: 60 MG/DL
HGB BLD-MCNC: 13.2 G/DL
IMM GRANULOCYTES NFR BLD AUTO: 0.8 %
LDLC SERPL CALC-MCNC: 133 MG/DL
LYMPHOCYTES # BLD AUTO: 2.1 K/UL
LYMPHOCYTES NFR BLD AUTO: 32.4 %
M TB IFN-G BLD-IMP: NEGATIVE
MAN DIFF?: NORMAL
MCHC RBC-ENTMCNC: 26.2 PG
MCHC RBC-ENTMCNC: 31.3 GM/DL
MCV RBC AUTO: 83.9 FL
MONOCYTES # BLD AUTO: 0.45 K/UL
MONOCYTES NFR BLD AUTO: 6.9 %
NEUTROPHILS # BLD AUTO: 3.81 K/UL
NEUTROPHILS NFR BLD AUTO: 58.8 %
NONHDLC SERPL-MCNC: 150 MG/DL
PLATELET # BLD AUTO: 185 K/UL
POTASSIUM SERPL-SCNC: 4.1 MMOL/L
PROT SERPL-MCNC: 7.5 G/DL
QUANTIFERON TB PLUS MITOGEN MINUS NIL: 8.38 IU/ML
QUANTIFERON TB PLUS NIL: 0.01 IU/ML
QUANTIFERON TB PLUS TB1 MINUS NIL: 0.1 IU/ML
QUANTIFERON TB PLUS TB2 MINUS NIL: 0.04 IU/ML
RBC # BLD: 5.03 M/UL
RBC # FLD: 13 %
SODIUM SERPL-SCNC: 141 MMOL/L
TRIGL SERPL-MCNC: 86 MG/DL
TSH SERPL-ACNC: 1.71 UIU/ML
WBC # FLD AUTO: 6.48 K/UL

## 2021-01-26 ENCOUNTER — TRANSCRIPTION ENCOUNTER (OUTPATIENT)
Age: 66
End: 2021-01-26

## 2021-10-07 ENCOUNTER — APPOINTMENT (OUTPATIENT)
Dept: INTERNAL MEDICINE | Facility: CLINIC | Age: 66
End: 2021-10-07
Payer: MEDICARE

## 2021-10-07 VITALS
BODY MASS INDEX: 20.98 KG/M2 | SYSTOLIC BLOOD PRESSURE: 130 MMHG | TEMPERATURE: 97.1 F | HEIGHT: 62 IN | OXYGEN SATURATION: 98 % | DIASTOLIC BLOOD PRESSURE: 76 MMHG | WEIGHT: 114 LBS | HEART RATE: 78 BPM | RESPIRATION RATE: 12 BRPM

## 2021-10-07 DIAGNOSIS — R35.0 FREQUENCY OF MICTURITION: ICD-10-CM

## 2021-10-07 DIAGNOSIS — H43.399 OTHER VITREOUS OPACITIES, UNSPECIFIED EYE: ICD-10-CM

## 2021-10-07 PROCEDURE — 99214 OFFICE O/P EST MOD 30 MIN: CPT

## 2021-10-07 NOTE — HISTORY OF PRESENT ILLNESS
[FreeTextEntry8] : Ms. DARREN CHÁVEZ is a 66 year old female presents with c/o right sided lower back pain \par \par She reports having right sided lower back pain that wraps around to the front. She also report urinary frequency on/off for the last couple of weeks. Denies dysuria, hematuria, vaginal discharge or itching .\par \par She also is experiencing abdominal discomfort after eating and increased gas discomfort. Reports stools are normal, denies mucus, dark/tarry stools \par \par She reports that for the last 2-3 months she has been having visual floaters. Denies headache, dizziness, weakness in extremities, confusion, unsteady gait\par \par Denies Fevers, chills, SOB, chest pain, N/V/D, leg swelling, headache\par \par

## 2021-10-07 NOTE — PHYSICAL EXAM
[No Acute Distress] : no acute distress [Well Nourished] : well nourished [Normal Sclera/Conjunctiva] : normal sclera/conjunctiva [PERRL] : pupils equal round and reactive to light [EOMI] : extraocular movements intact [Normal Outer Ear/Nose] : the outer ears and nose were normal in appearance [Normal Oropharynx] : the oropharynx was normal [No JVD] : no jugular venous distention [No Lymphadenopathy] : no lymphadenopathy [Supple] : supple [Thyroid Normal, No Nodules] : the thyroid was normal and there were no nodules present [No Respiratory Distress] : no respiratory distress  [No Accessory Muscle Use] : no accessory muscle use [Clear to Auscultation] : lungs were clear to auscultation bilaterally [Normal Rate] : normal rate  [Regular Rhythm] : with a regular rhythm [Normal S1, S2] : normal S1 and S2 [No Murmur] : no murmur heard [Pedal Pulses Present] : the pedal pulses are present [No Edema] : there was no peripheral edema [Soft] : abdomen soft [Non Tender] : non-tender [Non-distended] : non-distended [No Masses] : no abdominal mass palpated [No HSM] : no HSM [Normal Bowel Sounds] : normal bowel sounds [Normal Posterior Cervical Nodes] : no posterior cervical lymphadenopathy [Normal Anterior Cervical Nodes] : no anterior cervical lymphadenopathy [No Spinal Tenderness] : no spinal tenderness [No Joint Swelling] : no joint swelling [Grossly Normal Strength/Tone] : grossly normal strength/tone [No Rash] : no rash [Coordination Grossly Intact] : coordination grossly intact [No Focal Deficits] : no focal deficits [Normal Gait] : normal gait [Normal Affect] : the affect was normal [Normal Insight/Judgement] : insight and judgment were intact [No CVA Tenderness] : no CVA  tenderness

## 2021-10-07 NOTE — ASSESSMENT
[FreeTextEntry1] : \par \par Urinary frequency, Low back pain\par UA and UCX today\par Cipro 500 mg twice daily x 5 days\par \par Visual floaters\par Ophthalmology referral \par \par Abdominal discomfort \par Simethicone  80 mg PRN\par \par

## 2021-10-07 NOTE — REVIEW OF SYSTEMS
[Abdominal Pain] : abdominal pain [Negative] : Heme/Lymph [Frequency] : no frequency [FreeTextEntry3] : see hpi [FreeTextEntry9] : lower back pain

## 2021-10-12 LAB
ALBUMIN SERPL ELPH-MCNC: 4.5 G/DL
ALP BLD-CCNC: 72 U/L
ALT SERPL-CCNC: 16 U/L
ANION GAP SERPL CALC-SCNC: 11 MMOL/L
AST SERPL-CCNC: 23 U/L
BACTERIA UR CULT: ABNORMAL
BASOPHILS # BLD AUTO: 0.03 K/UL
BASOPHILS NFR BLD AUTO: 0.6 %
BILIRUB SERPL-MCNC: 0.4 MG/DL
BUN SERPL-MCNC: 10 MG/DL
CALCIUM SERPL-MCNC: 10.3 MG/DL
CHLORIDE SERPL-SCNC: 105 MMOL/L
CO2 SERPL-SCNC: 26 MMOL/L
CREAT SERPL-MCNC: 0.62 MG/DL
EOSINOPHIL # BLD AUTO: 0.04 K/UL
EOSINOPHIL NFR BLD AUTO: 0.8 %
GLUCOSE SERPL-MCNC: 93 MG/DL
HCT VFR BLD CALC: 40.1 %
HGB BLD-MCNC: 12.9 G/DL
IMM GRANULOCYTES NFR BLD AUTO: 0 %
LYMPHOCYTES # BLD AUTO: 1.71 K/UL
LYMPHOCYTES NFR BLD AUTO: 35 %
MAN DIFF?: NORMAL
MCHC RBC-ENTMCNC: 26.6 PG
MCHC RBC-ENTMCNC: 32.2 GM/DL
MCV RBC AUTO: 82.7 FL
MONOCYTES # BLD AUTO: 0.38 K/UL
MONOCYTES NFR BLD AUTO: 7.8 %
NEUTROPHILS # BLD AUTO: 2.73 K/UL
NEUTROPHILS NFR BLD AUTO: 55.8 %
PLATELET # BLD AUTO: 166 K/UL
POTASSIUM SERPL-SCNC: 4.5 MMOL/L
PROT SERPL-MCNC: 7.6 G/DL
RBC # BLD: 4.85 M/UL
RBC # FLD: 13 %
SODIUM SERPL-SCNC: 142 MMOL/L
WBC # FLD AUTO: 4.89 K/UL

## 2021-10-13 LAB
APPEARANCE: CLEAR
BACTERIA: ABNORMAL
BILIRUBIN URINE: NEGATIVE
BLOOD URINE: NEGATIVE
COLOR: COLORLESS
GLUCOSE QUALITATIVE U: NEGATIVE
HYALINE CASTS: 0 /LPF
KETONES URINE: NEGATIVE
LEUKOCYTE ESTERASE URINE: NEGATIVE
MICROSCOPIC-UA: NORMAL
NITRITE URINE: NEGATIVE
PH URINE: 6.5
PROTEIN URINE: NEGATIVE
RED BLOOD CELLS URINE: 0 /HPF
SPECIFIC GRAVITY URINE: 1.01
SQUAMOUS EPITHELIAL CELLS: 0 /HPF
UROBILINOGEN URINE: NORMAL
WHITE BLOOD CELLS URINE: 0 /HPF

## 2021-10-22 ENCOUNTER — NON-APPOINTMENT (OUTPATIENT)
Age: 66
End: 2021-10-22

## 2021-11-09 ENCOUNTER — APPOINTMENT (OUTPATIENT)
Dept: INTERNAL MEDICINE | Facility: CLINIC | Age: 66
End: 2021-11-09
Payer: MEDICARE

## 2021-11-09 ENCOUNTER — NON-APPOINTMENT (OUTPATIENT)
Age: 66
End: 2021-11-09

## 2021-11-09 VITALS
SYSTOLIC BLOOD PRESSURE: 129 MMHG | RESPIRATION RATE: 12 BRPM | BODY MASS INDEX: 20.56 KG/M2 | TEMPERATURE: 96.3 F | OXYGEN SATURATION: 99 % | WEIGHT: 112.43 LBS | DIASTOLIC BLOOD PRESSURE: 75 MMHG | HEART RATE: 74 BPM

## 2021-11-09 PROCEDURE — 99214 OFFICE O/P EST MOD 30 MIN: CPT | Mod: 25

## 2021-11-09 PROCEDURE — 93000 ELECTROCARDIOGRAM COMPLETE: CPT

## 2021-11-09 NOTE — PHYSICAL EXAM
[No Acute Distress] : no acute distress [Well Nourished] : well nourished [Normal Sclera/Conjunctiva] : normal sclera/conjunctiva [EOMI] : extraocular movements intact [Normal Outer Ear/Nose] : the outer ears and nose were normal in appearance [Normal Oropharynx] : the oropharynx was normal [No JVD] : no jugular venous distention [No Lymphadenopathy] : no lymphadenopathy [Supple] : supple [Thyroid Normal, No Nodules] : the thyroid was normal and there were no nodules present [No Respiratory Distress] : no respiratory distress  [No Accessory Muscle Use] : no accessory muscle use [Clear to Auscultation] : lungs were clear to auscultation bilaterally [Normal Rate] : normal rate  [Regular Rhythm] : with a regular rhythm [Normal S1, S2] : normal S1 and S2 [No Murmur] : no murmur heard [Pedal Pulses Present] : the pedal pulses are present [No Edema] : there was no peripheral edema [No Palpable Aorta] : no palpable aorta [Soft] : abdomen soft [Non Tender] : non-tender [Non-distended] : non-distended [Normal Bowel Sounds] : normal bowel sounds [Normal Posterior Cervical Nodes] : no posterior cervical lymphadenopathy [Normal Anterior Cervical Nodes] : no anterior cervical lymphadenopathy [No CVA Tenderness] : no CVA  tenderness [No Spinal Tenderness] : no spinal tenderness [No Joint Swelling] : no joint swelling [Grossly Normal Strength/Tone] : grossly normal strength/tone [No Rash] : no rash [Coordination Grossly Intact] : coordination grossly intact [No Focal Deficits] : no focal deficits [Normal Gait] : normal gait [Normal Affect] : the affect was normal [Normal Insight/Judgement] : insight and judgment were intact

## 2021-11-28 LAB
ALBUMIN SERPL ELPH-MCNC: 4.3 G/DL
ALP BLD-CCNC: 72 U/L
ALT SERPL-CCNC: 14 U/L
ANION GAP SERPL CALC-SCNC: 14 MMOL/L
APPEARANCE: CLEAR
APTT BLD: 30.3 SEC
AST SERPL-CCNC: 27 U/L
BACTERIA UR CULT: NORMAL
BASOPHILS # BLD AUTO: 0.02 K/UL
BASOPHILS NFR BLD AUTO: 0.4 %
BILIRUB SERPL-MCNC: 0.3 MG/DL
BILIRUBIN URINE: NEGATIVE
BLOOD URINE: NEGATIVE
BUN SERPL-MCNC: 9 MG/DL
CALCIUM SERPL-MCNC: 10 MG/DL
CHLORIDE SERPL-SCNC: 102 MMOL/L
CO2 SERPL-SCNC: 23 MMOL/L
COLOR: COLORLESS
CREAT SERPL-MCNC: 0.52 MG/DL
EOSINOPHIL # BLD AUTO: 0.04 K/UL
EOSINOPHIL NFR BLD AUTO: 0.9 %
GLUCOSE QUALITATIVE U: NEGATIVE
GLUCOSE SERPL-MCNC: 86 MG/DL
HCT VFR BLD CALC: 42.1 %
HGB BLD-MCNC: 13 G/DL
IMM GRANULOCYTES NFR BLD AUTO: 0 %
INR PPP: 0.97 RATIO
KETONES URINE: NEGATIVE
LEUKOCYTE ESTERASE URINE: NEGATIVE
LYMPHOCYTES # BLD AUTO: 1.81 K/UL
LYMPHOCYTES NFR BLD AUTO: 40.4 %
MAN DIFF?: NORMAL
MCHC RBC-ENTMCNC: 26.2 PG
MCHC RBC-ENTMCNC: 30.9 GM/DL
MCV RBC AUTO: 84.7 FL
MONOCYTES # BLD AUTO: 0.35 K/UL
MONOCYTES NFR BLD AUTO: 7.8 %
NEUTROPHILS # BLD AUTO: 2.26 K/UL
NEUTROPHILS NFR BLD AUTO: 50.5 %
NITRITE URINE: NEGATIVE
PH URINE: 6.5
PLATELET # BLD AUTO: 160 K/UL
POTASSIUM SERPL-SCNC: 4.3 MMOL/L
PROT SERPL-MCNC: 7.5 G/DL
PROTEIN URINE: NEGATIVE
PT BLD: 11.5 SEC
RBC # BLD: 4.97 M/UL
RBC # FLD: 12.8 %
SODIUM SERPL-SCNC: 139 MMOL/L
SPECIFIC GRAVITY URINE: 1
UROBILINOGEN URINE: NORMAL
WBC # FLD AUTO: 4.48 K/UL

## 2021-11-28 NOTE — HISTORY OF PRESENT ILLNESS
[No Pertinent Cardiac History] : no history of aortic stenosis, atrial fibrillation, coronary artery disease, recent myocardial infarction, or implantable device/pacemaker [No Pertinent Pulmonary History] : no history of asthma, COPD, sleep apnea, or smoking [No Adverse Anesthesia Reaction] : no adverse anesthesia reaction in self or family member [Chronic Anticoagulation] : no chronic anticoagulation [Chronic Kidney Disease] : no chronic kidney disease [Diabetes] : no diabetes [FreeTextEntry1] : Left cataract extraction [FreeTextEntry2] : 11/18/2021 [FreeTextEntry3] : Dr. Castillo [FreeTextEntry4] : Ms. DARREN CHÁVEZ is a 66 year old female presents for Medical clearance\par \par Fax: [phone]\par \par She is doing well. \par Denies SOB, chest pain, abdominal pain, N/V/D, leg swelling, headache, dizziness \par Offers no complaints\par \par \par

## 2021-12-27 ENCOUNTER — TRANSCRIPTION ENCOUNTER (OUTPATIENT)
Age: 66
End: 2021-12-27

## 2022-07-25 ENCOUNTER — TRANSCRIPTION ENCOUNTER (OUTPATIENT)
Age: 67
End: 2022-07-25

## 2022-07-25 ENCOUNTER — APPOINTMENT (OUTPATIENT)
Dept: FAMILY MEDICINE | Facility: CLINIC | Age: 67
End: 2022-07-25

## 2022-07-25 DIAGNOSIS — U07.1 COVID-19: ICD-10-CM

## 2022-07-25 PROCEDURE — 99213 OFFICE O/P EST LOW 20 MIN: CPT | Mod: 95

## 2022-07-25 RX ORDER — CIPROFLOXACIN HYDROCHLORIDE 500 MG/1
500 TABLET, FILM COATED ORAL
Qty: 10 | Refills: 0 | Status: COMPLETED | COMMUNITY
Start: 2021-10-07 | End: 2022-07-25

## 2022-07-25 NOTE — HISTORY OF PRESENT ILLNESS
[Home] : at home, [unfilled] , at the time of the visit. [Medical Office: ([facility])___] : at the medical office located in  [Verbal consent obtained from patient] : the patient, [unfilled] [FreeTextEntry8] : cc-- covid +\par \par 68 yo F with no medical history presents for covid +. She tested positive today. She has cough, nasal congestion, bodyaches. Tmax 99. She has been taking tylenol and mucinex with some relief. She does feel a little better today. She does also c/o nausea but no vomiting or diarrhea. She works with hospice patients.

## 2022-09-12 ENCOUNTER — NON-APPOINTMENT (OUTPATIENT)
Age: 67
End: 2022-09-12

## 2022-09-14 ENCOUNTER — APPOINTMENT (OUTPATIENT)
Dept: FAMILY MEDICINE | Facility: CLINIC | Age: 67
End: 2022-09-14

## 2022-12-23 ENCOUNTER — APPOINTMENT (OUTPATIENT)
Dept: FAMILY MEDICINE | Facility: CLINIC | Age: 67
End: 2022-12-23

## 2022-12-23 ENCOUNTER — NON-APPOINTMENT (OUTPATIENT)
Age: 67
End: 2022-12-23

## 2022-12-23 VITALS
OXYGEN SATURATION: 100 % | BODY MASS INDEX: 21.35 KG/M2 | DIASTOLIC BLOOD PRESSURE: 76 MMHG | HEIGHT: 62 IN | WEIGHT: 116 LBS | HEART RATE: 76 BPM | TEMPERATURE: 97.5 F | SYSTOLIC BLOOD PRESSURE: 142 MMHG

## 2022-12-23 DIAGNOSIS — Z01.818 ENCOUNTER FOR OTHER PREPROCEDURAL EXAMINATION: ICD-10-CM

## 2022-12-23 DIAGNOSIS — H26.9 UNSPECIFIED CATARACT: ICD-10-CM

## 2022-12-23 PROCEDURE — 99213 OFFICE O/P EST LOW 20 MIN: CPT | Mod: 25

## 2022-12-23 PROCEDURE — 93000 ELECTROCARDIOGRAM COMPLETE: CPT

## 2022-12-23 RX ORDER — ONDANSETRON 4 MG/1
4 TABLET, ORALLY DISINTEGRATING ORAL
Qty: 10 | Refills: 1 | Status: COMPLETED | COMMUNITY
Start: 2022-07-25 | End: 2022-12-23

## 2022-12-23 RX ORDER — METHYLPREDNISOLONE 4 MG/1
4 TABLET ORAL
Qty: 1 | Refills: 0 | Status: COMPLETED | COMMUNITY
Start: 2022-07-25 | End: 2022-12-23

## 2023-01-03 LAB
ALBUMIN SERPL ELPH-MCNC: 4.6 G/DL
ALP BLD-CCNC: 77 U/L
ALT SERPL-CCNC: 13 U/L
ANION GAP SERPL CALC-SCNC: 10 MMOL/L
AST SERPL-CCNC: 19 U/L
BASOPHILS # BLD AUTO: 0.03 K/UL
BASOPHILS NFR BLD AUTO: 0.4 %
BILIRUB SERPL-MCNC: 0.3 MG/DL
BUN SERPL-MCNC: 15 MG/DL
CALCIUM SERPL-MCNC: 10.2 MG/DL
CHLORIDE SERPL-SCNC: 103 MMOL/L
CO2 SERPL-SCNC: 29 MMOL/L
CREAT SERPL-MCNC: 0.64 MG/DL
EGFR: 96 ML/MIN/1.73M2
EOSINOPHIL # BLD AUTO: 0.06 K/UL
EOSINOPHIL NFR BLD AUTO: 0.8 %
GLUCOSE SERPL-MCNC: 94 MG/DL
HCT VFR BLD CALC: 38.4 %
HGB BLD-MCNC: 12 G/DL
IMM GRANULOCYTES NFR BLD AUTO: 0.1 %
LYMPHOCYTES # BLD AUTO: 2 K/UL
LYMPHOCYTES NFR BLD AUTO: 25.5 %
MAN DIFF?: NORMAL
MCHC RBC-ENTMCNC: 26.4 PG
MCHC RBC-ENTMCNC: 31.3 GM/DL
MCV RBC AUTO: 84.6 FL
MONOCYTES # BLD AUTO: 0.58 K/UL
MONOCYTES NFR BLD AUTO: 7.4 %
NEUTROPHILS # BLD AUTO: 5.17 K/UL
NEUTROPHILS NFR BLD AUTO: 65.8 %
PLATELET # BLD AUTO: 186 K/UL
POTASSIUM SERPL-SCNC: 4.7 MMOL/L
PROT SERPL-MCNC: 7.2 G/DL
RBC # BLD: 4.54 M/UL
RBC # FLD: 13.2 %
SODIUM SERPL-SCNC: 142 MMOL/L
WBC # FLD AUTO: 7.85 K/UL

## 2023-01-03 NOTE — HISTORY OF PRESENT ILLNESS
[No Pertinent Cardiac History] : no history of aortic stenosis, atrial fibrillation, coronary artery disease, recent myocardial infarction, or implantable device/pacemaker [No Pertinent Pulmonary History] : no history of asthma, COPD, sleep apnea, or smoking [No Adverse Anesthesia Reaction] : no adverse anesthesia reaction in self or family member [(Patient denies any chest pain, claudication, dyspnea on exertion, orthopnea, palpitations or syncope)] : Patient denies any chest pain, claudication, dyspnea on exertion, orthopnea, palpitations or syncope [Good (7-10 METs)] : Good (7-10 METs) [Chronic Anticoagulation] : no chronic anticoagulation [Chronic Kidney Disease] : no chronic kidney disease [Diabetes] : no diabetes [FreeTextEntry1] : right cataract [FreeTextEntry2] : 1/18/23 [FreeTextEntry3] : Pily Castillo MD [FreeTextEntry4] : 66 yo F with no significant PMH presents for cataract extraction. She does get anxious in the office. BPs usually normal.

## 2023-01-03 NOTE — ASSESSMENT
[Patient Optimized for Surgery] : Patient optimized for surgery [No Further Testing Recommended] : no further testing recommended [As per surgery] : as per surgery [FreeTextEntry4] : Pt with anxiety in office. Upon manual recheck of BP, BP even more elevated. Likely white coat HTN. BPs usually in normal range. Pt cleared for surgery.

## 2024-01-03 ENCOUNTER — APPOINTMENT (OUTPATIENT)
Dept: INTERNAL MEDICINE | Facility: CLINIC | Age: 69
End: 2024-01-03
Payer: MEDICARE

## 2024-01-03 ENCOUNTER — NON-APPOINTMENT (OUTPATIENT)
Age: 69
End: 2024-01-03

## 2024-01-03 VITALS
DIASTOLIC BLOOD PRESSURE: 79 MMHG | WEIGHT: 118 LBS | HEIGHT: 62 IN | SYSTOLIC BLOOD PRESSURE: 155 MMHG | OXYGEN SATURATION: 98 % | BODY MASS INDEX: 21.71 KG/M2 | HEART RATE: 76 BPM

## 2024-01-03 VITALS — DIASTOLIC BLOOD PRESSURE: 72 MMHG | SYSTOLIC BLOOD PRESSURE: 150 MMHG

## 2024-01-03 DIAGNOSIS — Z12.39 ENCOUNTER FOR OTHER SCREENING FOR MALIGNANT NEOPLASM OF BREAST: ICD-10-CM

## 2024-01-03 DIAGNOSIS — Z00.00 ENCOUNTER FOR GENERAL ADULT MEDICAL EXAMINATION W/OUT ABNORMAL FINDINGS: ICD-10-CM

## 2024-01-03 DIAGNOSIS — Z13.820 ENCOUNTER FOR SCREENING FOR OSTEOPOROSIS: ICD-10-CM

## 2024-01-03 DIAGNOSIS — Z13.6 ENCOUNTER FOR SCREENING FOR CARDIOVASCULAR DISORDERS: ICD-10-CM

## 2024-01-03 DIAGNOSIS — E55.9 VITAMIN D DEFICIENCY, UNSPECIFIED: ICD-10-CM

## 2024-01-03 DIAGNOSIS — E78.5 HYPERLIPIDEMIA, UNSPECIFIED: ICD-10-CM

## 2024-01-03 PROCEDURE — 93000 ELECTROCARDIOGRAM COMPLETE: CPT

## 2024-01-03 PROCEDURE — G0439: CPT

## 2024-01-03 NOTE — HISTORY OF PRESENT ILLNESS
[de-identified] : 67 yo F with HLD presents for annual.   Pt reports having colonoscopy 3-4 years ago. Had endoscopy as well. She will try to get records. Small polyp removed at the time.  completed pap smears. Needs mammo and dexa.  Concerns about H. pylori, had hx of it. Has occasional reflux symptoms.   Diet good, stays active  Not been checking BPs at home but does have machine. Reports getting anxious at the office.

## 2024-01-05 ENCOUNTER — TRANSCRIPTION ENCOUNTER (OUTPATIENT)
Age: 69
End: 2024-01-05

## 2024-01-05 LAB
25(OH)D3 SERPL-MCNC: 16.5 NG/ML
ALBUMIN SERPL ELPH-MCNC: 4.3 G/DL
ALP BLD-CCNC: 77 U/L
ALT SERPL-CCNC: 11 U/L
ANION GAP SERPL CALC-SCNC: 9 MMOL/L
AST SERPL-CCNC: 20 U/L
BASOPHILS # BLD AUTO: 0.05 K/UL
BASOPHILS NFR BLD AUTO: 1 %
BILIRUB SERPL-MCNC: 0.2 MG/DL
BUN SERPL-MCNC: 14 MG/DL
CALCIUM SERPL-MCNC: 9.9 MG/DL
CHLORIDE SERPL-SCNC: 105 MMOL/L
CHOLEST SERPL-MCNC: 213 MG/DL
CO2 SERPL-SCNC: 28 MMOL/L
CREAT SERPL-MCNC: 0.61 MG/DL
EGFR: 96 ML/MIN/1.73M2
EOSINOPHIL # BLD AUTO: 0.07 K/UL
EOSINOPHIL NFR BLD AUTO: 1.4 %
ESTIMATED AVERAGE GLUCOSE: 114 MG/DL
GLUCOSE SERPL-MCNC: 93 MG/DL
HBA1C MFR BLD HPLC: 5.6 %
HCT VFR BLD CALC: 38.3 %
HDLC SERPL-MCNC: 59 MG/DL
HGB BLD-MCNC: 12.3 G/DL
IMM GRANULOCYTES NFR BLD AUTO: 0.4 %
LDLC SERPL CALC-MCNC: 139 MG/DL
LYMPHOCYTES # BLD AUTO: 1.98 K/UL
LYMPHOCYTES NFR BLD AUTO: 41 %
MAN DIFF?: NORMAL
MCHC RBC-ENTMCNC: 26.7 PG
MCHC RBC-ENTMCNC: 32.1 GM/DL
MCV RBC AUTO: 83.3 FL
MONOCYTES # BLD AUTO: 0.39 K/UL
MONOCYTES NFR BLD AUTO: 8.1 %
NEUTROPHILS # BLD AUTO: 2.32 K/UL
NEUTROPHILS NFR BLD AUTO: 48.1 %
NONHDLC SERPL-MCNC: 154 MG/DL
PLATELET # BLD AUTO: 169 K/UL
POTASSIUM SERPL-SCNC: 4.9 MMOL/L
PROT SERPL-MCNC: 6.7 G/DL
RBC # BLD: 4.6 M/UL
RBC # FLD: 13.3 %
SODIUM SERPL-SCNC: 142 MMOL/L
TRIGL SERPL-MCNC: 87 MG/DL
TSH SERPL-ACNC: 1.98 UIU/ML
WBC # FLD AUTO: 4.83 K/UL

## 2024-01-18 VITALS — SYSTOLIC BLOOD PRESSURE: 125 MMHG | DIASTOLIC BLOOD PRESSURE: 80 MMHG

## 2024-01-24 ENCOUNTER — APPOINTMENT (OUTPATIENT)
Dept: INTERNAL MEDICINE | Facility: CLINIC | Age: 69
End: 2024-01-24
Payer: MEDICARE

## 2024-01-24 VITALS
DIASTOLIC BLOOD PRESSURE: 73 MMHG | HEIGHT: 62 IN | WEIGHT: 112 LBS | OXYGEN SATURATION: 100 % | SYSTOLIC BLOOD PRESSURE: 138 MMHG | HEART RATE: 78 BPM | BODY MASS INDEX: 20.61 KG/M2

## 2024-01-24 VITALS — SYSTOLIC BLOOD PRESSURE: 124 MMHG | DIASTOLIC BLOOD PRESSURE: 70 MMHG

## 2024-01-24 DIAGNOSIS — R03.0 ELEVATED BLOOD-PRESSURE READING, W/OUT DIAGNOSIS OF HYPERTENSION: ICD-10-CM

## 2024-01-24 PROCEDURE — 99213 OFFICE O/P EST LOW 20 MIN: CPT

## 2024-01-24 PROCEDURE — G2211 COMPLEX E/M VISIT ADD ON: CPT

## 2024-01-24 NOTE — HISTORY OF PRESENT ILLNESS
[Family Member] : family member [FreeTextEntry1] : BP f/u [de-identified] : 69 yo F with elevated BP presents for BP check. BP was 150/100 when she came in for her physical. Next 2-3 days after at home, was 140s/90s. After that, BPs 110s-120s/70s-80s. Pt attributed to poor diet around holidays. After physical, pt did change diet, cut back on salt, sugar. BP improved after that.

## 2024-02-06 ENCOUNTER — NON-APPOINTMENT (OUTPATIENT)
Age: 69
End: 2024-02-06

## 2024-02-07 ENCOUNTER — NON-APPOINTMENT (OUTPATIENT)
Age: 69
End: 2024-02-07

## 2024-02-15 DIAGNOSIS — M85.80 OTHER SPECIFIED DISORDERS OF BONE DENSITY AND STRUCTURE, UNSPECIFIED SITE: ICD-10-CM

## 2024-02-15 RX ORDER — PNV NO.95/FERROUS FUM/FOLIC AC 28MG-0.8MG
500-3.125 TABLET ORAL
Qty: 180 | Refills: 3 | Status: ACTIVE | COMMUNITY
Start: 2024-02-15 | End: 1900-01-01

## 2024-02-15 RX ORDER — CHOLECALCIFEROL (VITAMIN D3) 50 MCG
50 MCG TABLET ORAL
Qty: 90 | Refills: 3 | Status: ACTIVE | COMMUNITY
Start: 2024-02-15 | End: 1900-01-01

## 2024-03-04 ENCOUNTER — APPOINTMENT (OUTPATIENT)
Dept: INTERNAL MEDICINE | Facility: CLINIC | Age: 69
End: 2024-03-04

## 2024-03-06 ENCOUNTER — APPOINTMENT (OUTPATIENT)
Dept: INTERNAL MEDICINE | Facility: CLINIC | Age: 69
End: 2024-03-06
Payer: MEDICARE

## 2024-03-06 VITALS
BODY MASS INDEX: 20.43 KG/M2 | OXYGEN SATURATION: 99 % | HEART RATE: 68 BPM | SYSTOLIC BLOOD PRESSURE: 125 MMHG | DIASTOLIC BLOOD PRESSURE: 57 MMHG | WEIGHT: 111 LBS | HEIGHT: 62 IN

## 2024-03-06 PROCEDURE — G2211 COMPLEX E/M VISIT ADD ON: CPT

## 2024-03-06 PROCEDURE — 99214 OFFICE O/P EST MOD 30 MIN: CPT

## 2024-03-06 NOTE — HISTORY OF PRESENT ILLNESS
[FreeTextEntry8] : cc-- abd pain  69 yo F presents for abd pain, bloating. Has been present for past 2 months. Pt has hx H. pylori in past that was treated. Pt reports pain comes and goes, sometimes worsens with food, sometimes it doesn't. Sometimes happens with food intake, sometimes not. No rhyme or reason. Upper abd pain and also lower abd pain. Does have some loose stools, sometimes constipated. Last colonoscopy 2019. Pt would like to be rechecked for H. pylori. No nausea. Weight stable. On terbinafine for onychomycosis but only took few pills since Jan.  Diet is mostly plant based. She does eat Spanish food, but mostly home cooked and does not use butter. She uses very little oil. She has stopped eating cheese, rarely eats yogurt or other dairy.  Keystone Flap Text: The defect edges were debeveled with a #15 scalpel blade.  Given the location of the defect, shape of the defect a keystone flap was deemed most appropriate.  Using a sterile surgical marker, an appropriate keystone flap was drawn incorporating the defect, outlining the appropriate donor tissue and placing the expected incisions within the relaxed skin tension lines where possible. The area thus outlined was incised deep to adipose tissue with a #15 scalpel blade.  The skin margins were undermined to an appropriate distance in all directions around the primary defect and laterally outward around the flap utilizing iris scissors.

## 2024-03-06 NOTE — PHYSICAL EXAM
[Normal] : normal rate, regular rhythm, normal S1 and S2 and no murmur heard [de-identified] : pain RUQ, epigastric and umbilical.

## 2024-03-07 LAB — UREA BREATH TEST QL: NEGATIVE

## 2024-03-19 ENCOUNTER — APPOINTMENT (OUTPATIENT)
Dept: ORTHOPEDIC SURGERY | Facility: CLINIC | Age: 69
End: 2024-03-19

## 2024-03-19 DIAGNOSIS — M54.50 LOW BACK PAIN, UNSPECIFIED: ICD-10-CM

## 2024-03-20 ENCOUNTER — APPOINTMENT (OUTPATIENT)
Dept: ORTHOPEDIC SURGERY | Facility: CLINIC | Age: 69
End: 2024-03-20
Payer: COMMERCIAL

## 2024-03-20 VITALS
DIASTOLIC BLOOD PRESSURE: 80 MMHG | BODY MASS INDEX: 20.8 KG/M2 | HEIGHT: 62 IN | HEART RATE: 68 BPM | OXYGEN SATURATION: 98 % | WEIGHT: 113 LBS | SYSTOLIC BLOOD PRESSURE: 148 MMHG

## 2024-03-20 DIAGNOSIS — S39.012A STRAIN OF MUSCLE, FASCIA AND TENDON OF LOWER BACK, INITIAL ENCOUNTER: ICD-10-CM

## 2024-03-20 PROCEDURE — 72100 X-RAY EXAM L-S SPINE 2/3 VWS: CPT

## 2024-03-20 PROCEDURE — 99204 OFFICE O/P NEW MOD 45 MIN: CPT

## 2024-03-20 RX ORDER — METHYLPREDNISOLONE 4 MG/1
4 TABLET ORAL
Qty: 1 | Refills: 1 | Status: ACTIVE | COMMUNITY
Start: 2024-03-20 | End: 1900-01-01

## 2024-03-20 NOTE — PHYSICAL EXAM
[Normal] : Gait: normal [Pino's Sign] : negative Pino's sign [Pronator Drift] : negative pronator drift [SLR] : negative straight leg raise [de-identified] : 5 out of 5 motor strength, sensation is intact and symmetrical full range of motion flexion extension and rotation, no palpatory tenderness full range of motion of hips knees shoulders and elbows (all four extremities), no atrophy, negative straight leg raise, no pathological reflexes, no swelling, normal ambulation, no apparent distress skin is intact, no palpable lymph nodes, no upper or lower extremity instability, alert and oriented x3 and normal mood. Normal finger-to nose test.  No upper motor neuron findings. [de-identified] : XR AP Lat Lumbar 03/18/2024 -lumbar disc degenerative disease- reviewed with patient.

## 2024-03-20 NOTE — DISCUSSION/SUMMARY
[de-identified] : Lumbar disc degenerative disease. Possible allergy to NSAIDs, cannot take aspirin.  Discussed all options. MDP. Referral for physical therapy. If no better in 2-3 weeks, will obtain MRI. All options discussed including rest, medicine, home exercise, acupuncture, Chiropractic care, Physical Therapy, Pain management, and last resort surgery. All questions were answered, all alternatives discussed, and the patient is in complete agreement with the treatment plan which the patient contributed to and discussed with me through the shared decision-making process. Follow-up appointment as instructed. Any issues and the patient will call or come in sooner.

## 2024-03-20 NOTE — HISTORY OF PRESENT ILLNESS
[de-identified] : 68 year old female presents for initial evaluation of lower back pain since 3/12/24. Denies injury. She states that the pain had resolved initially, and returned this past weekend. The pain radiates to the right waist region. She states that at times the pain radiates to the right anterior thigh.  Denies numbness/tingling.  Getting out of a seated/laying position aggravates the pain. She went to [facility] on [facility]/18 and was recommended to take tylenol but no relief. She states she can not take ibuprofen due to an allergy. She states she had rashes with ibuprofen. PMHx: osteoporosis  No fever, chills, sweats, nausea/vomiting. No bowel or bladder dysfunction, no recent weight loss or gain. No night pain. This history is in addition to the intake form that I personally reviewed. [Stable] : stable

## 2024-03-20 NOTE — ADDENDUM
[FreeTextEntry1] : This note was written by Roberto Santamaria on 03/20/2024 acting as scribe for Dr. Hussain Dowling M.D.  I, Hussain Dowling MD, have read and attest that all the information, medical decision making and discharge instructions within are true and accurate.

## 2024-04-03 ENCOUNTER — APPOINTMENT (OUTPATIENT)
Dept: INTERNAL MEDICINE | Facility: CLINIC | Age: 69
End: 2024-04-03

## 2024-04-03 ENCOUNTER — NON-APPOINTMENT (OUTPATIENT)
Age: 69
End: 2024-04-03

## 2024-04-08 ENCOUNTER — APPOINTMENT (OUTPATIENT)
Dept: ORTHOPEDIC SURGERY | Facility: CLINIC | Age: 69
End: 2024-04-08
Payer: COMMERCIAL

## 2024-04-08 VITALS — WEIGHT: 113 LBS | HEIGHT: 62 IN | BODY MASS INDEX: 20.8 KG/M2

## 2024-04-08 DIAGNOSIS — M51.36 OTHER INTERVERTEBRAL DISC DEGENERATION, LUMBAR REGION: ICD-10-CM

## 2024-04-08 PROCEDURE — 99214 OFFICE O/P EST MOD 30 MIN: CPT

## 2024-04-08 NOTE — DISCUSSION/SUMMARY
[de-identified] : Lumbar disc degenerative disease. Feeling better. Possible allergy to NSAIDs, cannot take aspirin.  Discussed all options. Continue PT. F/U PRN. All options discussed including rest, medicine, home exercise, acupuncture, Chiropractic care, Physical Therapy, Pain management, and last resort surgery. All questions were answered, all alternatives discussed, and the patient is in complete agreement with the treatment plan which the patient contributed to and discussed with me through the shared decision-making process. Follow-up appointment as instructed. Any issues and the patient will call or come in sooner. Erasto agrees with plan.

## 2024-04-08 NOTE — ADDENDUM
[FreeTextEntry1] : This note was written by Roberto Santamaria on 04/08/2024 acting as scribe for Dr. Hussain Dowling M.D.  I, Hussain Dowling MD, have read and attest that all the information, medical decision making and discharge instructions within are true and accurate.

## 2024-04-08 NOTE — HISTORY OF PRESENT ILLNESS
[de-identified] : 68 year old female presents for follow up evaluation of lower back pain since 3/12/24. Denies injury. She states that the pain had resolved initially, and returned this past weekend. The pain radiates to the right waist region. She states that at times the pain radiates to the right anterior thigh.  Denies numbness/tingling.  Getting out of a seated/laying position aggravates the pain. She went to [facility] on [facility]/18 and was recommended to take tylenol but no relief. She states she can not take ibuprofen due to an allergy. She states she had rashes with ibuprofen. Was prescribed MDP and referred to PT at last visit. Has had about 6 visits of PT so far which she states has helped. She also states that the MDP helped.  PMHx: osteoporosis No fever, chills, sweats, nausea/vomiting. No bowel or bladder dysfunction, no recent weight loss or gain. No night pain. This history is in addition to the intake form that I personally reviewed. [Improving] : improving

## 2024-04-08 NOTE — PHYSICAL EXAM
[Normal] : Gait: normal [Pino's Sign] : negative Pino's sign [Pronator Drift] : negative pronator drift [SLR] : negative straight leg raise [de-identified] : 5 out of 5 motor strength, sensation is intact and symmetrical full range of motion flexion extension and rotation, no palpatory tenderness full range of motion of hips knees shoulders and elbows (all four extremities), no atrophy, negative straight leg raise, no pathological reflexes, no swelling, normal ambulation, no apparent distress skin is intact, no palpable lymph nodes, no upper or lower extremity instability, alert and oriented x3 and normal mood. Normal finger-to nose test.  No upper motor neuron findings. [de-identified] : XR AP Lat Lumbar 03/18/2024 -lumbar disc degenerative disease- reviewed with patient.

## 2024-04-24 ENCOUNTER — APPOINTMENT (OUTPATIENT)
Dept: GASTROENTEROLOGY | Facility: CLINIC | Age: 69
End: 2024-04-24
Payer: MEDICARE

## 2024-04-24 VITALS
HEART RATE: 79 BPM | OXYGEN SATURATION: 99 % | SYSTOLIC BLOOD PRESSURE: 128 MMHG | HEIGHT: 62 IN | BODY MASS INDEX: 20.06 KG/M2 | RESPIRATION RATE: 18 BRPM | WEIGHT: 109 LBS | DIASTOLIC BLOOD PRESSURE: 76 MMHG

## 2024-04-24 DIAGNOSIS — R10.9 UNSPECIFIED ABDOMINAL PAIN: ICD-10-CM

## 2024-04-24 DIAGNOSIS — R10.31 RIGHT LOWER QUADRANT PAIN: ICD-10-CM

## 2024-04-24 DIAGNOSIS — G89.29 RIGHT LOWER QUADRANT PAIN: ICD-10-CM

## 2024-04-24 PROCEDURE — 99203 OFFICE O/P NEW LOW 30 MIN: CPT

## 2024-04-24 NOTE — ASSESSMENT
[FreeTextEntry1] :  RLQ pain / flank pain - with normal US and allegedly normal urine  Rech urine today Check CT AP  if negative, will proceed with colonoscopy

## 2024-04-24 NOTE — HISTORY OF PRESENT ILLNESS
[FreeTextEntry1] : c/o RLQ pain, "gassy" for a few months now occurs off and on, not daily Resolves with passing gas; can massage the area and that helps pass the gas and the pain improves Pain can last a few hours Occurs anytime - not necessarily after meals or certain foods Can radiate around to the flank  Appendectomy 55 years ago Could this be related to adhesions?  Went to [facility] a month ago with an exacerbation of this pain - a urine was normal  Abdominal US (4/3/24): normal  Had a colonoscopy 3-4 years ago, with EGD, in [place] - had polyps

## 2024-04-25 LAB
APPEARANCE: CLEAR
BACTERIA: NEGATIVE /HPF
BILIRUBIN URINE: NEGATIVE
BLOOD URINE: NEGATIVE
CAST: 0 /LPF
COLOR: YELLOW
EPITHELIAL CELLS: 1 /HPF
GLUCOSE QUALITATIVE U: NEGATIVE MG/DL
KETONES URINE: ABNORMAL MG/DL
LEUKOCYTE ESTERASE URINE: NEGATIVE
MICROSCOPIC-UA: NORMAL
NITRITE URINE: NEGATIVE
PH URINE: 6.5
PROTEIN URINE: NEGATIVE MG/DL
RED BLOOD CELLS URINE: 1 /HPF
SPECIFIC GRAVITY URINE: 1.01
UROBILINOGEN URINE: 0.2 MG/DL
WHITE BLOOD CELLS URINE: 0 /HPF

## 2024-04-30 ENCOUNTER — TRANSCRIPTION ENCOUNTER (OUTPATIENT)
Age: 69
End: 2024-04-30

## 2024-05-01 ENCOUNTER — APPOINTMENT (OUTPATIENT)
Dept: CT IMAGING | Facility: IMAGING CENTER | Age: 69
End: 2024-05-01
Payer: MEDICARE

## 2024-05-01 ENCOUNTER — RESULT REVIEW (OUTPATIENT)
Age: 69
End: 2024-05-01

## 2024-05-01 ENCOUNTER — APPOINTMENT (OUTPATIENT)
Dept: CT IMAGING | Facility: IMAGING CENTER | Age: 69
End: 2024-05-01

## 2024-05-01 ENCOUNTER — OUTPATIENT (OUTPATIENT)
Dept: OUTPATIENT SERVICES | Facility: HOSPITAL | Age: 69
LOS: 1 days | End: 2024-05-01
Payer: MEDICARE

## 2024-05-01 DIAGNOSIS — R10.9 UNSPECIFIED ABDOMINAL PAIN: ICD-10-CM

## 2024-05-01 DIAGNOSIS — Z90.49 ACQUIRED ABSENCE OF OTHER SPECIFIED PARTS OF DIGESTIVE TRACT: Chronic | ICD-10-CM

## 2024-05-01 PROCEDURE — 74177 CT ABD & PELVIS W/CONTRAST: CPT | Mod: 26

## 2024-05-01 PROCEDURE — 74177 CT ABD & PELVIS W/CONTRAST: CPT

## 2024-05-15 ENCOUNTER — LABORATORY RESULT (OUTPATIENT)
Age: 69
End: 2024-05-15

## 2024-05-15 ENCOUNTER — APPOINTMENT (OUTPATIENT)
Dept: GASTROENTEROLOGY | Facility: CLINIC | Age: 69
End: 2024-05-15
Payer: MEDICARE

## 2024-05-15 PROCEDURE — 45380 COLONOSCOPY AND BIOPSY: CPT

## 2024-05-31 ENCOUNTER — NON-APPOINTMENT (OUTPATIENT)
Age: 69
End: 2024-05-31

## 2024-10-09 ENCOUNTER — APPOINTMENT (OUTPATIENT)
Dept: INTERNAL MEDICINE | Facility: CLINIC | Age: 69
End: 2024-10-09
Payer: MEDICARE

## 2024-10-09 VITALS
BODY MASS INDEX: 19.51 KG/M2 | DIASTOLIC BLOOD PRESSURE: 77 MMHG | HEART RATE: 74 BPM | OXYGEN SATURATION: 95 % | HEIGHT: 62 IN | WEIGHT: 106 LBS | SYSTOLIC BLOOD PRESSURE: 164 MMHG

## 2024-10-09 VITALS — DIASTOLIC BLOOD PRESSURE: 77 MMHG | SYSTOLIC BLOOD PRESSURE: 162 MMHG

## 2024-10-09 DIAGNOSIS — E55.9 VITAMIN D DEFICIENCY, UNSPECIFIED: ICD-10-CM

## 2024-10-09 DIAGNOSIS — E78.5 HYPERLIPIDEMIA, UNSPECIFIED: ICD-10-CM

## 2024-10-09 DIAGNOSIS — R03.0 ELEVATED BLOOD-PRESSURE READING, W/OUT DIAGNOSIS OF HYPERTENSION: ICD-10-CM

## 2024-10-09 DIAGNOSIS — R53.83 OTHER FATIGUE: ICD-10-CM

## 2024-10-09 PROCEDURE — 99214 OFFICE O/P EST MOD 30 MIN: CPT

## 2024-10-10 ENCOUNTER — NON-APPOINTMENT (OUTPATIENT)
Age: 69
End: 2024-10-10

## 2024-10-10 LAB
25(OH)D3 SERPL-MCNC: 20.3 NG/ML
ALBUMIN SERPL ELPH-MCNC: 4.2 G/DL
ALP BLD-CCNC: 75 U/L
ALT SERPL-CCNC: 10 U/L
ANION GAP SERPL CALC-SCNC: 13 MMOL/L
AST SERPL-CCNC: 20 U/L
BASOPHILS # BLD AUTO: 0.04 K/UL
BASOPHILS NFR BLD AUTO: 0.8 %
BILIRUB SERPL-MCNC: 0.3 MG/DL
BUN SERPL-MCNC: 10 MG/DL
CALCIUM SERPL-MCNC: 10.1 MG/DL
CHLORIDE SERPL-SCNC: 104 MMOL/L
CHOLEST SERPL-MCNC: 181 MG/DL
CO2 SERPL-SCNC: 26 MMOL/L
CREAT SERPL-MCNC: 0.58 MG/DL
EGFR: 97 ML/MIN/1.73M2
EOSINOPHIL # BLD AUTO: 0.12 K/UL
EOSINOPHIL NFR BLD AUTO: 2.4 %
ESTIMATED AVERAGE GLUCOSE: 108 MG/DL
GLUCOSE SERPL-MCNC: 91 MG/DL
HBA1C MFR BLD HPLC: 5.4 %
HCT VFR BLD CALC: 38.9 %
HDLC SERPL-MCNC: 58 MG/DL
HGB BLD-MCNC: 12.1 G/DL
IMM GRANULOCYTES NFR BLD AUTO: 0.2 %
LDLC SERPL CALC-MCNC: 106 MG/DL
LYMPHOCYTES # BLD AUTO: 1.86 K/UL
LYMPHOCYTES NFR BLD AUTO: 36.8 %
MAN DIFF?: NORMAL
MCHC RBC-ENTMCNC: 26.2 PG
MCHC RBC-ENTMCNC: 31.1 GM/DL
MCV RBC AUTO: 84.4 FL
MONOCYTES # BLD AUTO: 0.45 K/UL
MONOCYTES NFR BLD AUTO: 8.9 %
NEUTROPHILS # BLD AUTO: 2.57 K/UL
NEUTROPHILS NFR BLD AUTO: 50.9 %
NONHDLC SERPL-MCNC: 123 MG/DL
PLATELET # BLD AUTO: 104 K/UL
POTASSIUM SERPL-SCNC: 4.3 MMOL/L
PROT SERPL-MCNC: 6.9 G/DL
RBC # BLD: 4.61 M/UL
RBC # FLD: 13 %
SODIUM SERPL-SCNC: 143 MMOL/L
TRIGL SERPL-MCNC: 95 MG/DL
WBC # FLD AUTO: 5.05 K/UL

## 2024-10-16 NOTE — ED ADULT NURSE REASSESSMENT NOTE - NS ED NURSE REASSESS COMMENT FT1
Patient designated to ESSU 2. Report given to ESSU RACH Jara. [General Appearance - Alert] : alert [General Appearance - In No Acute Distress] : in no acute distress [General Appearance - Well Nourished] : well nourished [Sclera] : the sclera and conjunctiva were normal [Examination Of The Oral Cavity] : the lips and gums were normal [Oropharynx] : the oropharynx was normal [Jugular Venous Distention Increased] : there was no jugular-venous distention [Exaggerated Use Of Accessory Muscles For Inspiration] : no accessory muscle use [Auscultation Breath Sounds / Voice Sounds] : lungs were clear to auscultation bilaterally [Heart Sounds] : normal S1 and S2 [Murmurs] : no murmurs [Heart Sounds Pericardial Friction Rub] : no pericardial rub [Edema] : there was no peripheral edema [Abdomen Soft] : soft [No CVA Tenderness] : no ~M costovertebral angle tenderness [Abnormal Walk] : normal gait [Involuntary Movements] : no involuntary movements were seen [] : no rash [No Focal Deficits] : no focal deficits [Oriented To Time, Place, And Person] : oriented to person, place, and time [Affect] : the affect was normal [Mood] : the mood was normal [FreeTextEntry1] : +pallor

## 2024-11-27 ENCOUNTER — APPOINTMENT (OUTPATIENT)
Dept: INTERNAL MEDICINE | Facility: CLINIC | Age: 69
End: 2024-11-27